# Patient Record
Sex: MALE | Race: WHITE | NOT HISPANIC OR LATINO | Employment: FULL TIME | URBAN - METROPOLITAN AREA
[De-identification: names, ages, dates, MRNs, and addresses within clinical notes are randomized per-mention and may not be internally consistent; named-entity substitution may affect disease eponyms.]

---

## 2017-01-10 ENCOUNTER — HOSPITAL ENCOUNTER (OUTPATIENT)
Dept: BONE DENSITY | Facility: HOSPITAL | Age: 52
Discharge: HOME | End: 2017-01-10
Attending: INTERNAL MEDICINE | Admitting: INTERNAL MEDICINE

## 2017-01-10 DIAGNOSIS — M84.375D STRESS FRACTURE OF LEFT FOOT WITH ROUTINE HEALING, SUBSEQUENT ENCOUNTER: ICD-10-CM

## 2017-01-10 PROCEDURE — 77080 DXA BONE DENSITY AXIAL: CPT

## 2017-01-11 RX ORDER — SITAGLIPTIN 100 MG/1
TABLET, FILM COATED ORAL
Qty: 30 TABLET | Refills: 4 | Status: SHIPPED | OUTPATIENT
Start: 2017-01-11 | End: 2017-06-12 | Stop reason: SDUPTHER

## 2017-01-26 RX ORDER — DAPAGLIFLOZIN 10 MG/1
TABLET, FILM COATED ORAL
Qty: 90 TABLET | Refills: 2 | Status: SHIPPED | OUTPATIENT
Start: 2017-01-26 | End: 2017-11-10 | Stop reason: SDUPTHER

## 2017-04-05 ENCOUNTER — TRANSCRIBE ORDERS (OUTPATIENT)
Dept: CARDIOLOGY | Facility: CLINIC | Age: 52
End: 2017-04-05

## 2017-04-05 DIAGNOSIS — Z13.9 SCREENING: Primary | ICD-10-CM

## 2017-04-12 ENCOUNTER — HOSPITAL ENCOUNTER (OUTPATIENT)
Dept: CARDIOLOGY | Facility: HOSPITAL | Age: 52
Discharge: HOME | End: 2017-04-12
Attending: INTERNAL MEDICINE

## 2017-04-12 VITALS — SYSTOLIC BLOOD PRESSURE: 125 MMHG | HEART RATE: 70 BPM | DIASTOLIC BLOOD PRESSURE: 90 MMHG

## 2017-04-12 DIAGNOSIS — Z13.9 SCREENING: ICD-10-CM

## 2017-04-12 LAB
BH CV STRESS BP STAGE 1: NORMAL
BH CV STRESS BP STAGE 2: NORMAL
BH CV STRESS BP STAGE 3: NORMAL
BH CV STRESS DURATION MIN STAGE 1: 3
BH CV STRESS DURATION MIN STAGE 2: 3
BH CV STRESS DURATION MIN STAGE 3: 3
BH CV STRESS DURATION SEC STAGE 1: 0
BH CV STRESS DURATION SEC STAGE 2: 0
BH CV STRESS DURATION SEC STAGE 3: 0
BH CV STRESS GRADE STAGE 1: 10
BH CV STRESS GRADE STAGE 2: 12
BH CV STRESS GRADE STAGE 3: 14
BH CV STRESS HR STAGE 1: 117
BH CV STRESS HR STAGE 2: 138
BH CV STRESS HR STAGE 3: 166
BH CV STRESS METS STAGE 1: 4.6
BH CV STRESS METS STAGE 2: 7.1
BH CV STRESS METS STAGE 3: 10.2
BH CV STRESS PROTOCOL 1: NORMAL
BH CV STRESS RECOVERY BP: NORMAL MMHG
BH CV STRESS RECOVERY HR: 99 BPM
BH CV STRESS SPEED STAGE 1: 1.7
BH CV STRESS SPEED STAGE 2: 2.5
BH CV STRESS SPEED STAGE 3: 3.4
BH CV STRESS STAGE 1: 1
BH CV STRESS STAGE 2: 2
BH CV STRESS STAGE 3: 3
MAXIMAL PREDICTED HEART RATE: 170 BPM
PERCENT MAX PREDICTED HR: 97.65 %
STRESS BASELINE BP: NORMAL MMHG
STRESS BASELINE HR: 88 BPM
STRESS PERCENT HR: 115 %
STRESS POST ESTIMATED WORKLOAD: 10.2 METS
STRESS POST EXERCISE DUR MIN: 9 MIN
STRESS POST EXERCISE DUR SEC: 0 SEC
STRESS POST PEAK BP: NORMAL MMHG
STRESS POST PEAK HR: 166 BPM
STRESS TARGET HR: 145 BPM

## 2017-04-12 PROCEDURE — 93018 CV STRESS TEST I&R ONLY: CPT | Performed by: INTERNAL MEDICINE

## 2017-04-12 PROCEDURE — 93017 CV STRESS TEST TRACING ONLY: CPT

## 2017-04-12 PROCEDURE — 93016 CV STRESS TEST SUPVJ ONLY: CPT | Performed by: INTERNAL MEDICINE

## 2017-04-20 ENCOUNTER — OFFICE VISIT (OUTPATIENT)
Dept: FAMILY MEDICINE CLINIC | Facility: CLINIC | Age: 52
End: 2017-04-20

## 2017-04-20 VITALS
SYSTOLIC BLOOD PRESSURE: 122 MMHG | WEIGHT: 302.2 LBS | HEART RATE: 71 BPM | OXYGEN SATURATION: 96 % | BODY MASS INDEX: 44.76 KG/M2 | HEIGHT: 69 IN | DIASTOLIC BLOOD PRESSURE: 68 MMHG | TEMPERATURE: 98.2 F

## 2017-04-20 DIAGNOSIS — E11.9 TYPE 2 DIABETES MELLITUS WITHOUT COMPLICATION, WITHOUT LONG-TERM CURRENT USE OF INSULIN (HCC): ICD-10-CM

## 2017-04-20 DIAGNOSIS — E71.318: ICD-10-CM

## 2017-04-20 DIAGNOSIS — G57.93 NEUROPATHY OF BOTH FEET: ICD-10-CM

## 2017-04-20 DIAGNOSIS — K76.0 FATTY LIVER DISEASE, NONALCOHOLIC: ICD-10-CM

## 2017-04-20 DIAGNOSIS — Z00.00 PREVENTATIVE HEALTH CARE: Primary | ICD-10-CM

## 2017-04-20 DIAGNOSIS — E88.81 INSULIN RESISTANCE: ICD-10-CM

## 2017-04-20 DIAGNOSIS — Z79.899 MEDICATION MANAGEMENT: ICD-10-CM

## 2017-04-20 DIAGNOSIS — M54.40 MIDLINE LOW BACK PAIN WITH SCIATICA, SCIATICA LATERALITY UNSPECIFIED, UNSPECIFIED CHRONICITY: ICD-10-CM

## 2017-04-20 DIAGNOSIS — Z79.899 ENCOUNTER FOR LONG-TERM (CURRENT) USE OF MEDICATIONS: ICD-10-CM

## 2017-04-20 PROCEDURE — 99214 OFFICE O/P EST MOD 30 MIN: CPT | Performed by: INTERNAL MEDICINE

## 2017-04-20 NOTE — PATIENT INSTRUCTIONS
Diagnoses and all orders for this visit:    Preventative health care  -     CBC & Differential  -     Comprehensive Metabolic Panel  -     Lipid Panel With LDL / HDL Ratio  -     Thyroid Panel With TSH  -     Hemoglobin A1c  -     Microalbumin / Creatinine Urine Ratio    Type 2 diabetes mellitus without complication, without long-term current use of insulin  Comments:  Continue on aggressive control  Monitor closely  Follow up as planned  Orders:  -     CBC & Differential  -     Comprehensive Metabolic Panel  -     Lipid Panel With LDL / HDL Ratio  -     Thyroid Panel With TSH  -     Hemoglobin A1c  -     Microalbumin / Creatinine Urine Ratio    Fatty liver disease, nonalcoholic  Comments:  Continue same meds  Follow up as planned    Disorder of fatty acid oxidation (GILBERTO)  Comments:  Stable at present time    Neuropathy of both feet  Comments:  ongoing treatment at present time    Insulin resistance  Comments:  Stable at present time    Midline low back pain with sciatica, sciatica laterality unspecified, unspecified chronicity  Comments:  Back pqain is stable at present time    Encounter for long-term (current) use of medications  -     CBC & Differential  -     Comprehensive Metabolic Panel  -     Lipid Panel With LDL / HDL Ratio  -     Thyroid Panel With TSH  -     Hemoglobin A1c  -     Microalbumin / Creatinine Urine Ratio    Medication management  -     CBC & Differential  -     Comprehensive Metabolic Panel  -     Lipid Panel With LDL / HDL Ratio  -     Thyroid Panel With TSH  -     Hemoglobin A1c  -     Microalbumin / Creatinine Urine Ratio

## 2017-04-20 NOTE — PROGRESS NOTES
Raymundo Fall is a 51 y.o. male   Chief Complaint   Patient presents with   • Diabetes     follow up    • Hypertension         Subjective   Hypertension   This is a chronic problem. The current episode started more than 1 year ago. The problem has been waxing and waning since onset. Pertinent negatives include no anxiety, blurred vision, chest pain, headaches, malaise/fatigue, neck pain, orthopnea, palpitations, peripheral edema, PND, shortness of breath or sweats. There are no associated agents to hypertension. Risk factors for coronary artery disease include diabetes mellitus, family history, male gender and obesity. Past treatments include calcium channel blockers and angiotensin blockers. The current treatment provides moderate improvement. There are no compliance problems.  There is no history of angina, kidney disease, CAD/MI, CVA, heart failure, left ventricular hypertrophy, PVD, renovascular disease, retinopathy or a thyroid problem. There is no history of chronic renal disease, coarctation of the aorta, hyperaldosteronism, hypercortisolism, hyperparathyroidism, a hypertension causing med, pheochromocytoma or sleep apnea.        Raymundo Fall is a 51 y.o.male who presents with:follow up on his diabetes.  Activity back to normal after stress fracture.  Now feels like diabetes and issues under better control and that things are really improving.  Now back to normal activity.  BP and weifght better.  Strained muscle in thigh at one point and in pain for many days.      The following portions of the patient's history were reviewed and updated as appropriate: past medical history, surgeries, family history, allergies, current medications, past social history and problem list.    A comprehensive review of 14 systems was peformed  Review of Systems   Constitutional: Negative for chills, fatigue, fever, malaise/fatigue and unexpected weight change.   HENT: Negative for ear pain, hearing loss, sinus  "pressure, sore throat and tinnitus.    Eyes: Negative for blurred vision, pain, discharge and redness.   Respiratory: Negative for cough, shortness of breath and wheezing.    Cardiovascular: Negative for chest pain, palpitations, orthopnea, leg swelling and PND.   Gastrointestinal: Negative for abdominal pain, constipation, diarrhea and nausea.   Endocrine: Negative for cold intolerance and heat intolerance.   Genitourinary: Negative for difficulty urinating, flank pain and urgency.   Musculoskeletal: Negative for back pain, joint swelling, myalgias and neck pain.   Skin: Negative for rash and wound.   Allergic/Immunologic: Negative for environmental allergies and food allergies.   Neurological: Negative for dizziness, seizures, numbness and headaches.   Hematological: Negative for adenopathy. Does not bruise/bleed easily.   Psychiatric/Behavioral: Negative for decreased concentration, dysphoric mood and sleep disturbance. The patient is not nervous/anxious.    All other systems reviewed and are negative.      I have reviewed the patient's medical history in detail and updated the computerized patient record.      Objective   Vitals:    04/20/17 1053   BP: 122/68   BP Location: Left arm   Patient Position: Sitting   Cuff Size: Large Adult   Pulse: 71   Temp: 98.2 °F (36.8 °C)   TempSrc: Oral   SpO2: 96%   Weight: (!) 302 lb 3.2 oz (137 kg)   Height: 69\" (175.3 cm)   PainSc: 0-No pain           Physical Exam   Constitutional: He is oriented to person, place, and time. He appears well-developed and well-nourished. He is active and cooperative.  Non-toxic appearance. No distress.   HENT:   Head: Normocephalic and atraumatic. Hair is normal.   Right Ear: Hearing, tympanic membrane, external ear and ear canal normal. No drainage.   Left Ear: Hearing, tympanic membrane, external ear and ear canal normal. No drainage.   Nose: Nose normal. No rhinorrhea, nasal deformity or septal deviation.   Mouth/Throat: Oropharynx is " clear and moist.   Eyes: Conjunctivae, EOM and lids are normal. Pupils are equal, round, and reactive to light. Right eye exhibits no exudate. Left eye exhibits no exudate. Right pupil is round and reactive. Left pupil is round and reactive.   Neck: Trachea normal and normal range of motion. Neck supple. Normal carotid pulses, no hepatojugular reflux and no JVD present. Carotid bruit is not present. No tracheal deviation, no edema and normal range of motion present. No thyroid mass and no thyromegaly present.   Cardiovascular: Normal rate, regular rhythm, normal heart sounds, intact distal pulses and normal pulses.   No extrasystoles are present. PMI is not displaced.    Pulmonary/Chest: Effort normal and breath sounds normal. No accessory muscle usage. No tachypnea. No respiratory distress.   Abdominal: Soft. Normal appearance, normal aorta and bowel sounds are normal. He exhibits no abdominal bruit. There is no hepatosplenomegaly. There is no tenderness. Hernia confirmed negative in the right inguinal area and confirmed negative in the left inguinal area.   Bowel sounds present and normal in all four quadrants   Musculoskeletal: Normal range of motion.   Lymphadenopathy: No inguinal adenopathy noted on the right or left side.   Neurological: He is alert and oriented to person, place, and time. He has normal strength and normal reflexes. No cranial nerve deficit or sensory deficit. He displays a negative Romberg sign.   Skin: Skin is warm, dry and intact. He is not diaphoretic. No pallor.   Psychiatric: He has a normal mood and affect. His speech is normal and behavior is normal. Judgment and thought content normal. Cognition and memory are normal.   Nursing note and vitals reviewed.      Procedures                          Assessment/Plan     Diagnoses and all orders for this visit:    Preventative health care  -     CBC & Differential  -     Comprehensive Metabolic Panel  -     Lipid Panel With LDL / HDL Ratio  -      Thyroid Panel With TSH  -     Hemoglobin A1c  -     Microalbumin / Creatinine Urine Ratio    Type 2 diabetes mellitus without complication, without long-term current use of insulin  Comments:  Continue on aggressive control  Monitor closely  Follow up as planned  Orders:  -     CBC & Differential  -     Comprehensive Metabolic Panel  -     Lipid Panel With LDL / HDL Ratio  -     Thyroid Panel With TSH  -     Hemoglobin A1c  -     Microalbumin / Creatinine Urine Ratio    Fatty liver disease, nonalcoholic  Comments:  Continue same meds  Follow up as planned    Disorder of fatty acid oxidation (GILBERTO)  Comments:  Stable at present time    Neuropathy of both feet  Comments:  ongoing treatment at present time    Insulin resistance  Comments:  Stable at present time    Midline low back pain with sciatica, sciatica laterality unspecified, unspecified chronicity  Comments:  Back pqain is stable at present time    Encounter for long-term (current) use of medications  -     CBC & Differential  -     Comprehensive Metabolic Panel  -     Lipid Panel With LDL / HDL Ratio  -     Thyroid Panel With TSH  -     Hemoglobin A1c  -     Microalbumin / Creatinine Urine Ratio    Medication management  -     CBC & Differential  -     Comprehensive Metabolic Panel  -     Lipid Panel With LDL / HDL Ratio  -     Thyroid Panel With TSH  -     Hemoglobin A1c  -     Microalbumin / Creatinine Urine Ratio           Enrique Heath MD  4/20/2017  10:55 AM

## 2017-04-21 LAB
ALBUMIN SERPL-MCNC: 4.4 G/DL (ref 3.5–5.2)
ALBUMIN/CREAT UR: <3.4 MG/G CREAT (ref 0–30)
ALBUMIN/GLOB SERPL: 1.8 G/DL
ALP SERPL-CCNC: 66 U/L (ref 39–117)
ALT SERPL-CCNC: 67 U/L (ref 1–41)
AST SERPL-CCNC: 30 U/L (ref 1–40)
BASOPHILS # BLD AUTO: 0.01 10*3/MM3 (ref 0–0.2)
BASOPHILS NFR BLD AUTO: 0.2 % (ref 0–1.5)
BILIRUB SERPL-MCNC: 0.6 MG/DL (ref 0.1–1.2)
BUN SERPL-MCNC: 13 MG/DL (ref 6–20)
BUN/CREAT SERPL: 14.1 (ref 7–25)
CALCIUM SERPL-MCNC: 9.1 MG/DL (ref 8.6–10.5)
CHLORIDE SERPL-SCNC: 101 MMOL/L (ref 98–107)
CHOLEST SERPL-MCNC: 162 MG/DL (ref 0–200)
CO2 SERPL-SCNC: 23.8 MMOL/L (ref 22–29)
CREAT SERPL-MCNC: 0.92 MG/DL (ref 0.76–1.27)
CREAT UR-MCNC: 87 MG/DL
EOSINOPHIL # BLD AUTO: 0.2 10*3/MM3 (ref 0–0.7)
EOSINOPHIL NFR BLD AUTO: 4.5 % (ref 0.3–6.2)
ERYTHROCYTE [DISTWIDTH] IN BLOOD BY AUTOMATED COUNT: 12.8 % (ref 11.5–14.5)
FT4I SERPL CALC-MCNC: 2.3 (ref 1.2–4.9)
GLOBULIN SER CALC-MCNC: 2.4 GM/DL
GLUCOSE SERPL-MCNC: 198 MG/DL (ref 65–99)
HBA1C MFR BLD: 7.6 % (ref 4.8–5.6)
HCT VFR BLD AUTO: 51.4 % (ref 40.4–52.2)
HDLC SERPL-MCNC: 40 MG/DL (ref 40–60)
HGB BLD-MCNC: 17.1 G/DL (ref 13.7–17.6)
IMM GRANULOCYTES # BLD: 0 10*3/MM3 (ref 0–0.03)
IMM GRANULOCYTES NFR BLD: 0 % (ref 0–0.5)
LDLC SERPL CALC-MCNC: 98 MG/DL (ref 0–100)
LDLC/HDLC SERPL: 2.44 {RATIO}
LYMPHOCYTES # BLD AUTO: 1.59 10*3/MM3 (ref 0.9–4.8)
LYMPHOCYTES NFR BLD AUTO: 35.4 % (ref 19.6–45.3)
MCH RBC QN AUTO: 30.1 PG (ref 27–32.7)
MCHC RBC AUTO-ENTMCNC: 33.3 G/DL (ref 32.6–36.4)
MCV RBC AUTO: 90.5 FL (ref 79.8–96.2)
MICROALBUMIN UR-MCNC: <3 UG/ML
MONOCYTES # BLD AUTO: 0.38 10*3/MM3 (ref 0.2–1.2)
MONOCYTES NFR BLD AUTO: 8.5 % (ref 5–12)
NEUTROPHILS # BLD AUTO: 2.31 10*3/MM3 (ref 1.9–8.1)
NEUTROPHILS NFR BLD AUTO: 51.4 % (ref 42.7–76)
PLATELET # BLD AUTO: 139 10*3/MM3 (ref 140–500)
POTASSIUM SERPL-SCNC: 4.5 MMOL/L (ref 3.5–5.2)
PROT SERPL-MCNC: 6.8 G/DL (ref 6–8.5)
RBC # BLD AUTO: 5.68 10*6/MM3 (ref 4.6–6)
SODIUM SERPL-SCNC: 140 MMOL/L (ref 136–145)
T3RU NFR SERPL: 25 % (ref 24–39)
T4 SERPL-MCNC: 9 UG/DL (ref 4.5–12)
TRIGL SERPL-MCNC: 122 MG/DL (ref 0–150)
TSH SERPL DL<=0.005 MIU/L-ACNC: 1.56 UIU/ML (ref 0.45–4.5)
VLDLC SERPL CALC-MCNC: 24.4 MG/DL (ref 5–40)
WBC # BLD AUTO: 4.49 10*3/MM3 (ref 4.5–10.7)

## 2017-04-22 LAB
EBV EA IGG SER-ACNC: <9 U/ML (ref 0–8.9)
EBV NA IGG SER IA-ACNC: <18 U/ML (ref 0–17.9)
EBV VCA IGG SER IA-ACNC: <18 U/ML (ref 0–17.9)
EBV VCA IGM SER IA-ACNC: <36 U/ML (ref 0–35.9)
Lab: NORMAL
SERVICE CMNT-IMP: NORMAL
WRITTEN AUTHORIZATION: NORMAL

## 2017-06-12 RX ORDER — SITAGLIPTIN 100 MG/1
TABLET, FILM COATED ORAL
Qty: 30 TABLET | Refills: 3 | Status: SHIPPED | OUTPATIENT
Start: 2017-06-12 | End: 2017-10-17 | Stop reason: SDUPTHER

## 2017-08-21 ENCOUNTER — OFFICE VISIT (OUTPATIENT)
Dept: FAMILY MEDICINE CLINIC | Facility: CLINIC | Age: 52
End: 2017-08-21

## 2017-08-21 VITALS
DIASTOLIC BLOOD PRESSURE: 86 MMHG | HEIGHT: 69 IN | HEART RATE: 76 BPM | WEIGHT: 301.6 LBS | TEMPERATURE: 98.1 F | BODY MASS INDEX: 44.67 KG/M2 | SYSTOLIC BLOOD PRESSURE: 124 MMHG | OXYGEN SATURATION: 97 %

## 2017-08-21 DIAGNOSIS — E11.9 TYPE 2 DIABETES MELLITUS WITHOUT COMPLICATION, WITHOUT LONG-TERM CURRENT USE OF INSULIN (HCC): Primary | ICD-10-CM

## 2017-08-21 DIAGNOSIS — I10 ESSENTIAL HYPERTENSION: ICD-10-CM

## 2017-08-21 PROCEDURE — 99213 OFFICE O/P EST LOW 20 MIN: CPT | Performed by: NURSE PRACTITIONER

## 2017-08-21 NOTE — PROGRESS NOTES
Subjective   Raymundo Fall is a 52 y.o. male presents for diabetes and hypertension. Doing well, no concerns at the present time. Tolerating medication. Weight is stable. States he is active.     Hypertension   This is a chronic problem. The current episode started more than 1 year ago. The problem is unchanged. The problem is controlled. Pertinent negatives include no anxiety, blurred vision, chest pain, headaches, malaise/fatigue, neck pain, orthopnea, palpitations, peripheral edema, PND, shortness of breath or sweats. There are no associated agents to hypertension. There are no known risk factors for coronary artery disease. The current treatment provides significant improvement. There are no compliance problems.         The following portions of the patient's history were reviewed and updated as appropriate: allergies, current medications, past family history, past medical history, past social history, past surgical history and problem list.    Review of Systems   Constitutional: Negative.  Negative for malaise/fatigue.   HENT: Negative.    Eyes: Negative.  Negative for blurred vision.   Respiratory: Negative.  Negative for shortness of breath.    Cardiovascular: Negative.  Negative for chest pain, palpitations, orthopnea and PND.   Gastrointestinal: Negative.    Endocrine: Negative.    Genitourinary: Negative.    Musculoskeletal: Negative.  Negative for neck pain.   Skin: Negative.    Allergic/Immunologic: Negative.    Neurological: Negative.  Negative for headaches.   Hematological: Negative.    Psychiatric/Behavioral: Negative.        Objective   Physical Exam   Constitutional: He is oriented to person, place, and time. He appears well-developed and well-nourished.   HENT:   Head: Normocephalic and atraumatic.   Eyes: Pupils are equal, round, and reactive to light.   Neck: Normal range of motion. Neck supple.   Cardiovascular: Normal rate, regular rhythm and normal heart sounds.  Exam reveals no gallop  and no friction rub.    No murmur heard.  Pulmonary/Chest: Effort normal and breath sounds normal. No respiratory distress. He has no wheezes. He has no rales.   Abdominal: Soft. Bowel sounds are normal. He exhibits no distension. There is no tenderness.   Neurological: He is alert and oriented to person, place, and time.   Skin: Skin is warm and dry.   Psychiatric: He has a normal mood and affect.   Vitals reviewed.      Assessment/Plan   Raymundo was seen today for diabetes mellitus.    Diagnoses and all orders for this visit:    Type 2 diabetes mellitus without complication, without long-term current use of insulin  -     Hemoglobin A1c  -     Comprehensive metabolic panel    Essential hypertension

## 2017-08-21 NOTE — PATIENT INSTRUCTIONS

## 2017-08-22 LAB
ALBUMIN SERPL-MCNC: 4.7 G/DL (ref 3.5–5.2)
ALBUMIN/GLOB SERPL: 1.9 G/DL
ALP SERPL-CCNC: 64 U/L (ref 39–117)
ALT SERPL-CCNC: 93 U/L (ref 1–41)
AST SERPL-CCNC: 41 U/L (ref 1–40)
BILIRUB SERPL-MCNC: 0.5 MG/DL (ref 0.1–1.2)
BUN SERPL-MCNC: 14 MG/DL (ref 6–20)
BUN/CREAT SERPL: 16.7 (ref 7–25)
CALCIUM SERPL-MCNC: 9.7 MG/DL (ref 8.6–10.5)
CHLORIDE SERPL-SCNC: 99 MMOL/L (ref 98–107)
CO2 SERPL-SCNC: 24 MMOL/L (ref 22–29)
CREAT SERPL-MCNC: 0.84 MG/DL (ref 0.76–1.27)
GLOBULIN SER CALC-MCNC: 2.5 GM/DL
GLUCOSE SERPL-MCNC: 178 MG/DL (ref 65–99)
HBA1C MFR BLD: 8.4 % (ref 4.8–5.6)
POTASSIUM SERPL-SCNC: 4.3 MMOL/L (ref 3.5–5.2)
PROT SERPL-MCNC: 7.2 G/DL (ref 6–8.5)
SODIUM SERPL-SCNC: 139 MMOL/L (ref 136–145)

## 2017-09-05 RX ORDER — AMLODIPINE AND VALSARTAN 5; 320 MG/1; MG/1
TABLET ORAL
Qty: 90 TABLET | Refills: 0 | Status: SHIPPED | OUTPATIENT
Start: 2017-09-05 | End: 2017-12-07 | Stop reason: SDUPTHER

## 2017-12-07 RX ORDER — AMLODIPINE AND VALSARTAN 5; 320 MG/1; MG/1
1 TABLET ORAL DAILY
Qty: 90 TABLET | Refills: 1 | Status: SHIPPED | OUTPATIENT
Start: 2017-12-07 | End: 2018-05-24 | Stop reason: SDUPTHER

## 2018-01-15 RX ORDER — SITAGLIPTIN 100 MG/1
TABLET, FILM COATED ORAL
Qty: 30 TABLET | Refills: 1 | Status: CANCELLED | OUTPATIENT
Start: 2018-01-15

## 2018-01-24 RX ORDER — SITAGLIPTIN 100 MG/1
TABLET, FILM COATED ORAL
Qty: 30 TABLET | Refills: 1 | Status: SHIPPED | OUTPATIENT
Start: 2018-01-24 | End: 2018-12-03 | Stop reason: SDUPTHER

## 2018-02-23 ENCOUNTER — RESULTS ENCOUNTER (OUTPATIENT)
Dept: FAMILY MEDICINE CLINIC | Facility: CLINIC | Age: 53
End: 2018-02-23

## 2018-02-23 ENCOUNTER — OFFICE VISIT (OUTPATIENT)
Dept: FAMILY MEDICINE CLINIC | Facility: CLINIC | Age: 53
End: 2018-02-23

## 2018-02-23 VITALS
BODY MASS INDEX: 43.84 KG/M2 | HEIGHT: 69 IN | HEART RATE: 76 BPM | SYSTOLIC BLOOD PRESSURE: 115 MMHG | TEMPERATURE: 97.8 F | OXYGEN SATURATION: 97 % | WEIGHT: 296 LBS | DIASTOLIC BLOOD PRESSURE: 70 MMHG

## 2018-02-23 DIAGNOSIS — Z12.11 SCREENING FOR COLON CANCER: ICD-10-CM

## 2018-02-23 DIAGNOSIS — E11.9 TYPE 2 DIABETES MELLITUS WITHOUT COMPLICATION, WITHOUT LONG-TERM CURRENT USE OF INSULIN (HCC): Primary | ICD-10-CM

## 2018-02-23 PROCEDURE — 99213 OFFICE O/P EST LOW 20 MIN: CPT | Performed by: NURSE PRACTITIONER

## 2018-02-23 NOTE — PROGRESS NOTES
Subjective   Raymundo Fall is a 52 y.o. male presents for routine follow up for hypertension and diabetes. Tolerating medication well without side effects. Does report increased urination due to farxiga.    Hypertension   This is a chronic problem. The current episode started more than 1 year ago. The problem is unchanged. The problem is controlled. Associated symptoms include headaches. Pertinent negatives include no blurred vision, chest pain or sweats. There is no history of CVA, PVD or retinopathy.   Diabetes   He has type 2 diabetes mellitus. No MedicAlert identification noted. The initial diagnosis of diabetes was made 15 years ago. Hypoglycemia symptoms include headaches. Pertinent negatives for hypoglycemia include no confusion, dizziness, hunger, mood changes, nervousness/anxiousness, pallor, seizures, sleepiness, speech difficulty, sweats or tremors. Associated symptoms include foot paresthesias and polyuria. Pertinent negatives for diabetes include no blurred vision, no chest pain, no fatigue, no foot ulcerations, no polydipsia, no polyphagia, no visual change, no weakness and no weight loss. Pertinent negatives for hypoglycemia complications include no blackouts, no hospitalization, no nocturnal hypoglycemia, no required assistance and no required glucagon injection. Symptoms are stable. Pertinent negatives for diabetic complications include no CVA, heart disease, impotence, nephropathy, peripheral neuropathy, PVD or retinopathy. Risk factors for coronary artery disease include hypertension. Current diabetic treatment includes diet and oral agent (triple therapy). He is compliant with treatment all of the time. His weight is stable. He is following a generally healthy diet. Meal planning includes avoidance of concentrated sweets. He has not had a previous visit with a dietitian. He participates in exercise every other day. He monitors blood glucose at home 1-2 x per week. Blood glucose monitoring  compliance is good. There is no change in his home blood glucose trend. He sees a podiatrist.Eye exam is current.        The following portions of the patient's history were reviewed and updated as appropriate: allergies, current medications, past family history, past medical history, past social history, past surgical history and problem list.    Review of Systems   Constitutional: Negative for fatigue and weight loss.   Eyes: Negative for blurred vision.   Cardiovascular: Negative for chest pain.   Endocrine: Positive for polyuria. Negative for polydipsia and polyphagia.   Genitourinary: Negative for impotence.   Skin: Negative for pallor.   Neurological: Positive for headaches. Negative for dizziness, tremors, seizures, speech difficulty and weakness.   Psychiatric/Behavioral: Negative for confusion. The patient is not nervous/anxious.        Objective   Physical Exam   Constitutional: He is oriented to person, place, and time. He appears well-developed and well-nourished.   HENT:   Mouth/Throat: Oropharynx is clear and moist.   Cardiovascular: Normal rate, regular rhythm and normal heart sounds.  Exam reveals no gallop and no friction rub.    No murmur heard.  Pulmonary/Chest: Effort normal and breath sounds normal. No respiratory distress. He has no wheezes. He has no rales.   Neurological: He is alert and oriented to person, place, and time.   Skin: Skin is warm and dry.   Vitals reviewed.      Assessment/Plan   Raymundo was seen today for hypertension and diabetes.    Diagnoses and all orders for this visit:    Type 2 diabetes mellitus without complication, without long-term current use of insulin  -     Comprehensive metabolic panel  -     Hemoglobin A1c    Screening for colon cancer  -     Cologuard - Stool, Per Rectum; Future

## 2018-02-24 LAB
ALBUMIN SERPL-MCNC: 4.6 G/DL (ref 3.5–5.2)
ALBUMIN/GLOB SERPL: 1.7 G/DL
ALP SERPL-CCNC: 69 U/L (ref 39–117)
ALT SERPL-CCNC: 63 U/L (ref 1–41)
AST SERPL-CCNC: 29 U/L (ref 1–40)
BILIRUB SERPL-MCNC: 0.6 MG/DL (ref 0.1–1.2)
BUN SERPL-MCNC: 18 MG/DL (ref 6–20)
BUN/CREAT SERPL: 20.5 (ref 7–25)
CALCIUM SERPL-MCNC: 9.4 MG/DL (ref 8.6–10.5)
CHLORIDE SERPL-SCNC: 98 MMOL/L (ref 98–107)
CO2 SERPL-SCNC: 27.9 MMOL/L (ref 22–29)
CREAT SERPL-MCNC: 0.88 MG/DL (ref 0.76–1.27)
GFR SERPLBLD CREATININE-BSD FMLA CKD-EPI: 111 ML/MIN/1.73
GFR SERPLBLD CREATININE-BSD FMLA CKD-EPI: 91 ML/MIN/1.73
GLOBULIN SER CALC-MCNC: 2.7 GM/DL
GLUCOSE SERPL-MCNC: 213 MG/DL (ref 65–99)
HBA1C MFR BLD: 7.9 % (ref 4.8–5.6)
POTASSIUM SERPL-SCNC: 4.5 MMOL/L (ref 3.5–5.2)
PROT SERPL-MCNC: 7.3 G/DL (ref 6–8.5)
SODIUM SERPL-SCNC: 134 MMOL/L (ref 136–145)

## 2018-05-24 RX ORDER — AMLODIPINE AND VALSARTAN 5; 320 MG/1; MG/1
TABLET ORAL
Qty: 90 TABLET | Refills: 1 | Status: SHIPPED | OUTPATIENT
Start: 2018-05-24 | End: 2018-07-24

## 2018-05-29 PROCEDURE — 71046 X-RAY EXAM CHEST 2 VIEWS: CPT | Performed by: RADIOLOGY

## 2018-07-24 ENCOUNTER — OFFICE VISIT (OUTPATIENT)
Dept: FAMILY MEDICINE CLINIC | Facility: CLINIC | Age: 53
End: 2018-07-24

## 2018-07-24 VITALS
TEMPERATURE: 97.7 F | WEIGHT: 290.5 LBS | SYSTOLIC BLOOD PRESSURE: 115 MMHG | BODY MASS INDEX: 43.03 KG/M2 | HEIGHT: 69 IN | HEART RATE: 73 BPM | DIASTOLIC BLOOD PRESSURE: 70 MMHG | OXYGEN SATURATION: 97 %

## 2018-07-24 DIAGNOSIS — E11.9 TYPE 2 DIABETES MELLITUS WITHOUT COMPLICATION, WITHOUT LONG-TERM CURRENT USE OF INSULIN (HCC): Primary | ICD-10-CM

## 2018-07-24 DIAGNOSIS — I10 ESSENTIAL HYPERTENSION: ICD-10-CM

## 2018-07-24 PROCEDURE — 99213 OFFICE O/P EST LOW 20 MIN: CPT | Performed by: NURSE PRACTITIONER

## 2018-07-24 RX ORDER — ATORVASTATIN CALCIUM 10 MG/1
10 TABLET, FILM COATED ORAL DAILY
Qty: 90 TABLET | Refills: 1 | Status: SHIPPED | OUTPATIENT
Start: 2018-07-24 | End: 2020-01-29

## 2018-07-24 RX ORDER — AMLODIPINE BESYLATE 5 MG/1
5 TABLET ORAL DAILY
Qty: 90 TABLET | Refills: 1 | Status: SHIPPED | OUTPATIENT
Start: 2018-07-24 | End: 2019-01-14 | Stop reason: SDUPTHER

## 2018-07-24 RX ORDER — LOSARTAN POTASSIUM 100 MG/1
100 TABLET ORAL DAILY
Qty: 90 TABLET | Refills: 1 | Status: SHIPPED | OUTPATIENT
Start: 2018-07-24 | End: 2019-01-14 | Stop reason: SDUPTHER

## 2018-07-24 NOTE — PROGRESS NOTES
Subjective   Raymundo Fall is a 53 y.o. male presents for routine follow up for diabetes. Doing well. Reports no significant changes in blood sugar. Tolerating medication without side effects. Also with hypertension, taking medication, blood pressure well controlled today. Agreeable for labs today. Has a podiatrist, follows up annually, states it has been about one year since he saw the physician last but will schedule an appointment as he is having foot pain.     Diabetes   He presents for his follow-up diabetic visit. He has type 2 diabetes mellitus. His disease course has been stable. There are no hypoglycemic associated symptoms. Pertinent negatives for hypoglycemia include no headaches or sweats. There are no diabetic associated symptoms. Pertinent negatives for diabetes include no blurred vision and no chest pain. There are no hypoglycemic complications. Symptoms are improving. Diabetic complications include peripheral neuropathy. Risk factors for coronary artery disease include obesity, male sex and hypertension. He is compliant with treatment most of the time.   Hypertension   This is a chronic problem. The current episode started more than 1 year ago. The problem is unchanged. The problem is controlled. Pertinent negatives include no anxiety, blurred vision, chest pain, headaches, malaise/fatigue, neck pain, orthopnea, palpitations, peripheral edema, PND, shortness of breath or sweats. There are no associated agents to hypertension. Risk factors for coronary artery disease include male gender, obesity and diabetes mellitus. Past treatments include calcium channel blockers and angiotensin blockers. Current antihypertension treatment includes calcium channel blockers and angiotensin blockers. The current treatment provides significant improvement. There are no compliance problems.         The following portions of the patient's history were reviewed and updated as appropriate: allergies, current  medications, past family history, past medical history, past social history, past surgical history and problem list.    Review of Systems   Constitutional: Negative.  Negative for malaise/fatigue.   HENT: Negative.    Eyes: Negative.  Negative for blurred vision.   Respiratory: Negative.  Negative for shortness of breath.    Cardiovascular: Negative.  Negative for chest pain, palpitations, orthopnea and PND.   Gastrointestinal: Negative.    Endocrine: Negative.    Genitourinary: Negative.    Musculoskeletal: Positive for arthralgias (left foot, f/u with podiatrist planned). Negative for neck pain.   Skin: Negative.    Allergic/Immunologic: Negative.    Neurological: Negative.  Negative for headaches.   Hematological: Negative.    Psychiatric/Behavioral: Negative.        Objective   Physical Exam   Constitutional: He is oriented to person, place, and time. He appears well-developed and well-nourished.   Cardiovascular: Normal rate, regular rhythm and normal heart sounds.  Exam reveals no gallop and no friction rub.    No murmur heard.  Pulmonary/Chest: Effort normal and breath sounds normal. No respiratory distress. He has no wheezes. He has no rales.   Neurological: He is alert and oriented to person, place, and time.   Skin: Skin is warm and dry.   Psychiatric: He has a normal mood and affect.   Vitals reviewed.      Assessment/Plan   Raymundo was seen today for diabetes and hypertension.    Diagnoses and all orders for this visit:    Type 2 diabetes mellitus without complication, without long-term current use of insulin (CMS/[facility])  -     Hemoglobin A1c  -     Comprehensive metabolic panel  -     Microalbumin / Creatinine Urine Ratio - Urine, Clean Catch    Essential hypertension  -     Comprehensive metabolic panel    Other orders  -     amLODIPine (NORVASC) 5 MG tablet; Take 1 tablet by mouth Daily.  -     losartan (COZAAR) 100 MG tablet; Take 1 tablet by mouth Daily.  -     atorvastatin (LIPITOR) 10 MG tablet;  Take 1 tablet by mouth Daily.      Recommend lipitor daily as preventative with diabetes, patient with elevated liver enzymes, fluctuates, concerned for continued increase, patient would like to wait at present time.  Discussed medication changes, patient concerned with valsartan and interested in changing medications.   Will d/c and start amlodipine and losartan.  F/u 3 months

## 2018-07-25 LAB
ALBUMIN SERPL-MCNC: 4.9 G/DL (ref 3.5–5.2)
ALBUMIN/CREAT UR: <5.1 MG/G CREAT (ref 0–30)
ALBUMIN/GLOB SERPL: 2 G/DL
ALP SERPL-CCNC: 69 U/L (ref 39–117)
ALT SERPL-CCNC: 69 U/L (ref 1–41)
AST SERPL-CCNC: 28 U/L (ref 1–40)
BILIRUB SERPL-MCNC: 0.4 MG/DL (ref 0.1–1.2)
BUN SERPL-MCNC: 16 MG/DL (ref 6–20)
BUN/CREAT SERPL: 18.2 (ref 7–25)
CALCIUM SERPL-MCNC: 9.5 MG/DL (ref 8.6–10.5)
CHLORIDE SERPL-SCNC: 100 MMOL/L (ref 98–107)
CO2 SERPL-SCNC: 23.3 MMOL/L (ref 22–29)
CREAT SERPL-MCNC: 0.88 MG/DL (ref 0.76–1.27)
CREAT UR-MCNC: 59.3 MG/DL
GLOBULIN SER CALC-MCNC: 2.5 GM/DL
GLUCOSE SERPL-MCNC: 153 MG/DL (ref 65–99)
HBA1C MFR BLD: 8.1 % (ref 4.8–5.6)
MICROALBUMIN UR-MCNC: <3 UG/ML
POTASSIUM SERPL-SCNC: 4.4 MMOL/L (ref 3.5–5.2)
PROT SERPL-MCNC: 7.4 G/DL (ref 6–8.5)
SODIUM SERPL-SCNC: 139 MMOL/L (ref 136–145)

## 2018-12-04 RX ORDER — SITAGLIPTIN 100 MG/1
TABLET, FILM COATED ORAL
Qty: 30 TABLET | Refills: 5 | Status: SHIPPED | OUTPATIENT
Start: 2018-12-04 | End: 2019-06-06 | Stop reason: SDUPTHER

## 2018-12-04 RX ORDER — DAPAGLIFLOZIN 10 MG/1
TABLET, FILM COATED ORAL
Qty: 90 TABLET | Refills: 1 | Status: SHIPPED | OUTPATIENT
Start: 2018-12-04 | End: 2019-06-06 | Stop reason: SDUPTHER

## 2019-01-15 RX ORDER — LOSARTAN POTASSIUM 100 MG/1
TABLET ORAL
Qty: 90 TABLET | Refills: 0 | Status: SHIPPED | OUTPATIENT
Start: 2019-01-15 | End: 2019-04-16 | Stop reason: SDUPTHER

## 2019-01-15 RX ORDER — AMLODIPINE BESYLATE 5 MG/1
TABLET ORAL
Qty: 90 TABLET | Refills: 0 | Status: SHIPPED | OUTPATIENT
Start: 2019-01-15 | End: 2019-04-16 | Stop reason: SDUPTHER

## 2019-01-25 ENCOUNTER — OFFICE VISIT (OUTPATIENT)
Dept: FAMILY MEDICINE CLINIC | Facility: CLINIC | Age: 54
End: 2019-01-25

## 2019-01-25 VITALS
TEMPERATURE: 98.1 F | HEART RATE: 62 BPM | WEIGHT: 293.2 LBS | BODY MASS INDEX: 43.43 KG/M2 | HEIGHT: 69 IN | RESPIRATION RATE: 13 BRPM | SYSTOLIC BLOOD PRESSURE: 122 MMHG | DIASTOLIC BLOOD PRESSURE: 78 MMHG

## 2019-01-25 DIAGNOSIS — E11.65 TYPE 2 DIABETES MELLITUS WITH HYPERGLYCEMIA, WITHOUT LONG-TERM CURRENT USE OF INSULIN (HCC): Primary | ICD-10-CM

## 2019-01-25 DIAGNOSIS — Z79.899 MEDICATION MANAGEMENT: ICD-10-CM

## 2019-01-25 DIAGNOSIS — Z12.5 SCREENING FOR PROSTATE CANCER: ICD-10-CM

## 2019-01-25 DIAGNOSIS — E78.5 HYPERLIPIDEMIA, UNSPECIFIED HYPERLIPIDEMIA TYPE: ICD-10-CM

## 2019-01-25 DIAGNOSIS — Z13.29 SCREENING FOR THYROID DISORDER: ICD-10-CM

## 2019-01-25 PROCEDURE — 90471 IMMUNIZATION ADMIN: CPT | Performed by: NURSE PRACTITIONER

## 2019-01-25 PROCEDURE — 99214 OFFICE O/P EST MOD 30 MIN: CPT | Performed by: NURSE PRACTITIONER

## 2019-01-25 PROCEDURE — 90674 CCIIV4 VAC NO PRSV 0.5 ML IM: CPT | Performed by: NURSE PRACTITIONER

## 2019-01-25 RX ORDER — LANCETS 33 GAUGE
EACH MISCELLANEOUS
Qty: 100 EACH | Refills: 1 | Status: SHIPPED | OUTPATIENT
Start: 2019-01-25 | End: 2019-01-30

## 2019-01-26 LAB
ALBUMIN SERPL-MCNC: 4.4 G/DL (ref 3.5–5.2)
ALBUMIN/GLOB SERPL: 1.7 G/DL
ALP SERPL-CCNC: 64 U/L (ref 39–117)
ALT SERPL-CCNC: 62 U/L (ref 1–41)
AST SERPL-CCNC: 32 U/L (ref 1–40)
BASOPHILS # BLD AUTO: 0.03 10*3/MM3 (ref 0–0.2)
BASOPHILS NFR BLD AUTO: 0.5 % (ref 0–1.5)
BILIRUB SERPL-MCNC: 0.5 MG/DL (ref 0.1–1.2)
BUN SERPL-MCNC: 16 MG/DL (ref 6–20)
BUN/CREAT SERPL: 19 (ref 7–25)
CALCIUM SERPL-MCNC: 9.6 MG/DL (ref 8.6–10.5)
CHLORIDE SERPL-SCNC: 102 MMOL/L (ref 98–107)
CHOLEST SERPL-MCNC: 154 MG/DL (ref 0–200)
CO2 SERPL-SCNC: 23.4 MMOL/L (ref 22–29)
CREAT SERPL-MCNC: 0.84 MG/DL (ref 0.76–1.27)
EOSINOPHIL # BLD AUTO: 0.25 10*3/MM3 (ref 0–0.7)
EOSINOPHIL NFR BLD AUTO: 4.1 % (ref 0.3–6.2)
ERYTHROCYTE [DISTWIDTH] IN BLOOD BY AUTOMATED COUNT: 12.5 % (ref 11.5–14.5)
GLOBULIN SER CALC-MCNC: 2.6 GM/DL
GLUCOSE SERPL-MCNC: 171 MG/DL (ref 65–99)
HBA1C MFR BLD: 8.2 % (ref 4.8–5.6)
HCT VFR BLD AUTO: 50 % (ref 40.4–52.2)
HDLC SERPL-MCNC: 38 MG/DL (ref 40–60)
HGB BLD-MCNC: 17 G/DL (ref 13.7–17.6)
IMM GRANULOCYTES # BLD AUTO: 0 10*3/MM3 (ref 0–0.03)
IMM GRANULOCYTES NFR BLD AUTO: 0 % (ref 0–0.5)
LDLC SERPL CALC-MCNC: 79 MG/DL (ref 0–100)
LDLC/HDLC SERPL: 2.09 {RATIO}
LYMPHOCYTES # BLD AUTO: 2.1 10*3/MM3 (ref 0.9–4.8)
LYMPHOCYTES NFR BLD AUTO: 34.3 % (ref 19.6–45.3)
MCH RBC QN AUTO: 30.6 PG (ref 27–32.7)
MCHC RBC AUTO-ENTMCNC: 34 G/DL (ref 32.6–36.4)
MCV RBC AUTO: 90.1 FL (ref 79.8–96.2)
MONOCYTES # BLD AUTO: 0.42 10*3/MM3 (ref 0.2–1.2)
MONOCYTES NFR BLD AUTO: 6.9 % (ref 5–12)
NEUTROPHILS # BLD AUTO: 3.33 10*3/MM3 (ref 1.9–8.1)
NEUTROPHILS NFR BLD AUTO: 54.2 % (ref 42.7–76)
PLATELET # BLD AUTO: 161 10*3/MM3 (ref 140–500)
POTASSIUM SERPL-SCNC: 4.2 MMOL/L (ref 3.5–5.2)
PROT SERPL-MCNC: 7 G/DL (ref 6–8.5)
PSA SERPL-MCNC: 0.32 NG/ML (ref 0–4)
RBC # BLD AUTO: 5.55 10*6/MM3 (ref 4.6–6)
SODIUM SERPL-SCNC: 141 MMOL/L (ref 136–145)
TRIGL SERPL-MCNC: 183 MG/DL (ref 0–150)
TSH SERPL DL<=0.005 MIU/L-ACNC: 1.8 MIU/ML (ref 0.27–4.2)
VLDLC SERPL CALC-MCNC: 36.6 MG/DL (ref 5–40)
WBC # BLD AUTO: 6.13 10*3/MM3 (ref 4.5–10.7)

## 2019-01-30 ENCOUNTER — TELEPHONE (OUTPATIENT)
Dept: FAMILY MEDICINE CLINIC | Facility: CLINIC | Age: 54
End: 2019-01-30

## 2019-01-30 DIAGNOSIS — E11.65 TYPE 2 DIABETES MELLITUS WITH HYPERGLYCEMIA, WITHOUT LONG-TERM CURRENT USE OF INSULIN (HCC): ICD-10-CM

## 2019-01-30 DIAGNOSIS — E11.9 TYPE 2 DIABETES MELLITUS WITHOUT COMPLICATION, WITHOUT LONG-TERM CURRENT USE OF INSULIN (HCC): ICD-10-CM

## 2019-01-30 DIAGNOSIS — E11.9 TYPE 2 DIABETES MELLITUS WITHOUT COMPLICATION, WITHOUT LONG-TERM CURRENT USE OF INSULIN (HCC): Primary | ICD-10-CM

## 2019-01-30 RX ORDER — BLOOD-GLUCOSE METER
1 KIT MISCELLANEOUS AS NEEDED
Qty: 1 EACH | Refills: 0 | Status: SHIPPED | OUTPATIENT
Start: 2019-01-30 | End: 2020-01-30

## 2019-01-30 RX ORDER — LANCETS 28 GAUGE
EACH MISCELLANEOUS
Qty: 100 EACH | Refills: 12 | Status: SHIPPED | OUTPATIENT
Start: 2019-01-30

## 2019-02-01 ENCOUNTER — TRANSCRIBE ORDERS (OUTPATIENT)
Dept: ADMINISTRATIVE | Facility: HOSPITAL | Age: 54
End: 2019-02-01

## 2019-02-01 DIAGNOSIS — Z13.9 VISIT FOR SCREENING: Primary | ICD-10-CM

## 2019-02-08 ENCOUNTER — HOSPITAL ENCOUNTER (OUTPATIENT)
Dept: CARDIOLOGY | Facility: HOSPITAL | Age: 54
Discharge: HOME | End: 2019-02-08

## 2019-02-08 ENCOUNTER — APPOINTMENT (OUTPATIENT)
Dept: WOMENS IMAGING | Facility: HOSPITAL | Age: 54
End: 2019-02-08

## 2019-02-08 VITALS
HEART RATE: 78 BPM | HEIGHT: 69 IN | DIASTOLIC BLOOD PRESSURE: 70 MMHG | SYSTOLIC BLOOD PRESSURE: 125 MMHG | WEIGHT: 288 LBS | BODY MASS INDEX: 42.65 KG/M2

## 2019-02-08 DIAGNOSIS — Z13.9 VISIT FOR SCREENING: ICD-10-CM

## 2019-02-08 LAB
BH CV ECHO MEAS - DIST AO DIAM: NORMAL CM
BH CV VAS BP LEFT ARM: NORMAL MMHG
BH CV VAS BP RIGHT ARM: NORMAL MMHG
BH CV XLRA MEAS - MID AO DIAM: NORMAL CM
BH CV XLRA MEAS - PAD LEFT ABI DP: 1.28
BH CV XLRA MEAS - PAD LEFT ABI PT: 1.28
BH CV XLRA MEAS - PAD LEFT ARM: 122 MMHG
BH CV XLRA MEAS - PAD LEFT LEG DP: 160 MMHG
BH CV XLRA MEAS - PAD LEFT LEG PT: 160 MMHG
BH CV XLRA MEAS - PAD RIGHT ABI DP: 1.28
BH CV XLRA MEAS - PAD RIGHT ABI PT: 1.28
BH CV XLRA MEAS - PAD RIGHT ARM: 125 MMHG
BH CV XLRA MEAS - PAD RIGHT LEG DP: 160 MMHG
BH CV XLRA MEAS - PAD RIGHT LEG PT: 160 MMHG
BH CV XLRA MEAS - PROX AO DIAM: 2.11 CM
BH CV XLRA MEAS LEFT ICA/CCA RATIO: 0.73
BH CV XLRA MEAS LEFT MID CCA PSV: NORMAL CM/SEC
BH CV XLRA MEAS LEFT MID ICA PSV: NORMAL CM/SEC
BH CV XLRA MEAS LEFT PROX ECA PSV: NORMAL CM/SEC
BH CV XLRA MEAS RIGHT ICA/CCA RATIO: 1.05
BH CV XLRA MEAS RIGHT MID CCA PSV: NORMAL CM/SEC
BH CV XLRA MEAS RIGHT MID ICA PSV: NORMAL CM/SEC
BH CV XLRA MEAS RIGHT PROX ECA PSV: NORMAL CM/SEC

## 2019-02-08 PROCEDURE — 93799 UNLISTED CV SVC/PROCEDURE: CPT

## 2019-02-08 PROCEDURE — 71046 X-RAY EXAM CHEST 2 VIEWS: CPT | Performed by: RADIOLOGY

## 2019-04-16 RX ORDER — LOSARTAN POTASSIUM 100 MG/1
TABLET ORAL
Qty: 90 TABLET | Refills: 1 | Status: SHIPPED | OUTPATIENT
Start: 2019-04-16 | End: 2019-10-07 | Stop reason: SDUPTHER

## 2019-04-16 RX ORDER — AMLODIPINE BESYLATE 5 MG/1
TABLET ORAL
Qty: 90 TABLET | Refills: 1 | Status: SHIPPED | OUTPATIENT
Start: 2019-04-16 | End: 2019-10-07 | Stop reason: SDUPTHER

## 2019-05-28 ENCOUNTER — OFFICE VISIT (OUTPATIENT)
Dept: FAMILY MEDICINE CLINIC | Facility: CLINIC | Age: 54
End: 2019-05-28

## 2019-05-28 VITALS
BODY MASS INDEX: 43.6 KG/M2 | DIASTOLIC BLOOD PRESSURE: 84 MMHG | TEMPERATURE: 97.8 F | SYSTOLIC BLOOD PRESSURE: 130 MMHG | HEART RATE: 76 BPM | WEIGHT: 291 LBS | OXYGEN SATURATION: 96 %

## 2019-05-28 DIAGNOSIS — I10 ESSENTIAL HYPERTENSION: ICD-10-CM

## 2019-05-28 DIAGNOSIS — E11.65 TYPE 2 DIABETES MELLITUS WITH HYPERGLYCEMIA, WITHOUT LONG-TERM CURRENT USE OF INSULIN (HCC): Primary | ICD-10-CM

## 2019-05-28 PROCEDURE — 90471 IMMUNIZATION ADMIN: CPT | Performed by: NURSE PRACTITIONER

## 2019-05-28 PROCEDURE — 99213 OFFICE O/P EST LOW 20 MIN: CPT | Performed by: NURSE PRACTITIONER

## 2019-05-28 PROCEDURE — 90715 TDAP VACCINE 7 YRS/> IM: CPT | Performed by: NURSE PRACTITIONER

## 2019-05-28 PROCEDURE — 90732 PPSV23 VACC 2 YRS+ SUBQ/IM: CPT | Performed by: NURSE PRACTITIONER

## 2019-05-28 PROCEDURE — 90472 IMMUNIZATION ADMIN EACH ADD: CPT | Performed by: NURSE PRACTITIONER

## 2019-05-28 NOTE — PROGRESS NOTES
Subjective   Raymundo Fall is a 54 y.o. male presents for diabetes and hypertension follow up. Did report increased left foot pain. Started on Aleve every 12 hours daily. Followed up with Dr. Torres.   Occasionally checks glucose at home, 130. Stable.     Hypertension   This is a chronic problem. The current episode started more than 1 year ago. The problem is unchanged. The problem is controlled. Pertinent negatives include no anxiety, blurred vision, chest pain, headaches, malaise/fatigue, neck pain, orthopnea, palpitations, peripheral edema, PND, shortness of breath or sweats. There are no associated agents to hypertension. Past treatments include angiotensin blockers and calcium channel blockers. Current antihypertension treatment includes angiotensin blockers and calcium channel blockers. The current treatment provides moderate improvement. Compliance problems include exercise.    Diabetes   He presents for his follow-up diabetic visit. He has type 2 diabetes mellitus. Pertinent negatives for hypoglycemia include no headaches or sweats. There are no diabetic associated symptoms. Pertinent negatives for diabetes include no blurred vision and no chest pain. There are no hypoglycemic complications. Symptoms are stable. There are no diabetic complications. Risk factors for coronary artery disease include diabetes mellitus, dyslipidemia, hypertension, male sex and obesity. Current diabetic treatment includes oral agent (dual therapy). He is compliant with treatment most of the time.        The following portions of the patient's history were reviewed and updated as appropriate: allergies, current medications, past family history, past medical history, past social history, past surgical history and problem list.    Review of Systems   Constitutional: Negative.  Negative for malaise/fatigue.   HENT: Negative.    Eyes: Negative.  Negative for blurred vision.   Respiratory: Negative.  Negative for shortness  of breath.    Cardiovascular: Negative.  Negative for chest pain, palpitations, orthopnea and PND.   Gastrointestinal: Negative.    Endocrine: Negative.    Genitourinary: Negative.    Musculoskeletal: Negative.  Negative for neck pain.   Skin: Negative.    Allergic/Immunologic: Negative.    Neurological: Negative.  Negative for headaches.   Hematological: Negative.    Psychiatric/Behavioral: Negative.        Objective   Physical Exam   Constitutional: He is oriented to person, place, and time. He appears well-developed and well-nourished. No distress.   Neck: Neck supple.   Cardiovascular: Normal rate, regular rhythm and normal heart sounds. Exam reveals no gallop and no friction rub.   No murmur heard.  Pulmonary/Chest: Effort normal and breath sounds normal. No respiratory distress. He has no wheezes. He has no rales.   Abdominal: Soft. Bowel sounds are normal. He exhibits no distension. There is no tenderness.   Neurological: He is alert and oriented to person, place, and time.   Skin: Skin is warm and dry. He is not diaphoretic.   Psychiatric: He has a normal mood and affect.       Assessment/Plan   Raymundo was seen today for blood pressure check and diabetes.    Diagnoses and all orders for this visit:    Type 2 diabetes mellitus with hyperglycemia, without long-term current use of insulin (CMS/[facility])  -     Comprehensive metabolic panel  -     Hemoglobin A1c    Essential hypertension  -     Comprehensive metabolic panel    Other orders  -     Pneumococcal Polysaccharide Vaccine 23-Valent (PPSV23) Greater Than or Equal To 1yo Subcutaneous / IM  -     Tdap Vaccine Greater Than or Equal To 6yo IM      Ok for tdap and pneumonia vaccine  Up to date on eye exam and podiatry exam  Fasting labs today  Follow up in 4 months

## 2019-05-29 LAB
ALBUMIN SERPL-MCNC: 4.8 G/DL (ref 3.5–5.2)
ALBUMIN/GLOB SERPL: 2.3 G/DL
ALP SERPL-CCNC: 75 U/L (ref 39–117)
ALT SERPL-CCNC: 49 U/L (ref 1–41)
AST SERPL-CCNC: 20 U/L (ref 1–40)
BILIRUB SERPL-MCNC: 0.6 MG/DL (ref 0.2–1.2)
BUN SERPL-MCNC: 18 MG/DL (ref 6–20)
BUN/CREAT SERPL: 20.9 (ref 7–25)
CALCIUM SERPL-MCNC: 9.9 MG/DL (ref 8.6–10.5)
CHLORIDE SERPL-SCNC: 103 MMOL/L (ref 98–107)
CO2 SERPL-SCNC: 21.8 MMOL/L (ref 22–29)
CREAT SERPL-MCNC: 0.86 MG/DL (ref 0.76–1.27)
GLOBULIN SER CALC-MCNC: 2.1 GM/DL
GLUCOSE SERPL-MCNC: 275 MG/DL (ref 65–99)
HBA1C MFR BLD: 7.8 % (ref 4.8–5.6)
POTASSIUM SERPL-SCNC: 4.1 MMOL/L (ref 3.5–5.2)
PROT SERPL-MCNC: 6.9 G/DL (ref 6–8.5)
SODIUM SERPL-SCNC: 139 MMOL/L (ref 136–145)

## 2019-06-06 RX ORDER — DAPAGLIFLOZIN 10 MG/1
TABLET, FILM COATED ORAL
Qty: 90 TABLET | Refills: 0 | Status: SHIPPED | OUTPATIENT
Start: 2019-06-06 | End: 2019-09-05 | Stop reason: SDUPTHER

## 2019-06-06 RX ORDER — SITAGLIPTIN 100 MG/1
TABLET, FILM COATED ORAL
Qty: 30 TABLET | Refills: 4 | Status: SHIPPED | OUTPATIENT
Start: 2019-06-06 | End: 2019-11-07 | Stop reason: SDUPTHER

## 2019-08-19 ENCOUNTER — OFFICE VISIT (OUTPATIENT)
Dept: FAMILY MEDICINE CLINIC | Facility: CLINIC | Age: 54
End: 2019-08-19

## 2019-08-19 VITALS
DIASTOLIC BLOOD PRESSURE: 80 MMHG | SYSTOLIC BLOOD PRESSURE: 138 MMHG | TEMPERATURE: 98.6 F | HEART RATE: 78 BPM | HEIGHT: 69 IN | OXYGEN SATURATION: 98 % | WEIGHT: 292 LBS | BODY MASS INDEX: 43.25 KG/M2 | RESPIRATION RATE: 20 BRPM

## 2019-08-19 DIAGNOSIS — J98.01 COUGH DUE TO BRONCHOSPASM: Primary | ICD-10-CM

## 2019-08-19 PROCEDURE — 96372 THER/PROPH/DIAG INJ SC/IM: CPT | Performed by: NURSE PRACTITIONER

## 2019-08-19 PROCEDURE — 99213 OFFICE O/P EST LOW 20 MIN: CPT | Performed by: NURSE PRACTITIONER

## 2019-08-19 PROCEDURE — 94640 AIRWAY INHALATION TREATMENT: CPT | Performed by: NURSE PRACTITIONER

## 2019-08-19 RX ORDER — ALBUTEROL SULFATE 90 UG/1
2 AEROSOL, METERED RESPIRATORY (INHALATION) EVERY 4 HOURS PRN
Qty: 1 INHALER | Refills: 2 | Status: ON HOLD | OUTPATIENT
Start: 2019-08-19 | End: 2020-02-04

## 2019-08-19 RX ORDER — PREDNISONE 20 MG/1
40 TABLET ORAL DAILY
Qty: 10 TABLET | Refills: 0 | Status: SHIPPED | OUTPATIENT
Start: 2019-08-19 | End: 2019-08-24

## 2019-08-19 RX ORDER — IPRATROPIUM BROMIDE AND ALBUTEROL SULFATE 2.5; .5 MG/3ML; MG/3ML
3 SOLUTION RESPIRATORY (INHALATION) ONCE
Status: COMPLETED | OUTPATIENT
Start: 2019-08-19 | End: 2019-08-19

## 2019-08-19 RX ORDER — TRIAMCINOLONE ACETONIDE 40 MG/ML
40 INJECTION, SUSPENSION INTRA-ARTICULAR; INTRAMUSCULAR ONCE
Status: COMPLETED | OUTPATIENT
Start: 2019-08-19 | End: 2019-08-19

## 2019-08-19 RX ORDER — AZITHROMYCIN 250 MG/1
TABLET, FILM COATED ORAL
Qty: 6 TABLET | Refills: 0 | Status: CANCELLED | OUTPATIENT
Start: 2019-08-19

## 2019-08-19 RX ADMIN — TRIAMCINOLONE ACETONIDE 40 MG: 40 INJECTION, SUSPENSION INTRA-ARTICULAR; INTRAMUSCULAR at 15:57

## 2019-08-19 RX ADMIN — IPRATROPIUM BROMIDE AND ALBUTEROL SULFATE 3 ML: 2.5; .5 SOLUTION RESPIRATORY (INHALATION) at 15:39

## 2019-08-19 NOTE — PROGRESS NOTES
"Subjective   Raymundo Fall is a 54 y.o. male.     Chief Complaint   Patient presents with   • Cough     2 weeks or better   • URI      HPI  Patient is new to me.  He is here for Cough with dyspnea x 2 weeks and is worsening.  Cough is all the time, may be a little worse during the day.  Not taking anything for it except mucinex which is not helping.  Nothing seems to trigger cough but he has paroxysms that sometimes make him feel dyspneic. Cough is dry and his chest feels tight and like he is wheezy.  Denies PMH asthma or COPD.   Does not do well with cough meds-make him woozy.  Never gets sick and does not have allergies. No sick contacts.    Social History     Tobacco Use   • Smoking status: Never Smoker   • Smokeless tobacco: Never Used   Substance Use Topics   • Alcohol use: No   • Drug use: No       The following portions of the patient's history were reviewed and updated as appropriate: allergies, current medications, past family history, past medical history, past social history, past surgical history and problem list.    Review of Systems   Constitutional: Negative for chills, fatigue and fever.   HENT: Negative for congestion, ear discharge, ear pain, postnasal drip, rhinorrhea (minimal) and sore throat.    Eyes: Negative for discharge and itching.   Respiratory: Positive for cough, chest tightness, shortness of breath and wheezing.    Cardiovascular: Negative for chest pain and palpitations.   Gastrointestinal: Negative for abdominal pain, diarrhea, nausea and vomiting.   Musculoskeletal: Negative for arthralgias and myalgias.   Allergic/Immunologic: Negative for environmental allergies.   Neurological: Negative for weakness and headaches.       Objective   Blood pressure 138/80, pulse 78, temperature 98.6 °F (37 °C), resp. rate 20, height 174 cm (68.5\"), weight 132 kg (292 lb), SpO2 98 %.    Physical Exam   Constitutional: He is oriented to person, place, and time. He appears well-developed and " well-nourished. No distress.   HENT:   Head: Normocephalic and atraumatic.   Right Ear: Tympanic membrane, external ear and ear canal normal.   Left Ear: Tympanic membrane, external ear and ear canal normal.   Mouth/Throat: Oropharynx is clear and moist.   Eyes: Conjunctivae are normal. Right eye exhibits no discharge. Left eye exhibits no discharge.   Neck: Neck supple.   Cardiovascular: Normal rate, regular rhythm and normal heart sounds.   Pulmonary/Chest: Effort normal. He has decreased breath sounds.   Initially breath sounds tight but without wheezing or other adventitious sounds  Post MN breath sounds greatly improved, patient verbalizes less cough and no chest tightness post MN   Abdominal: Soft. Bowel sounds are normal. There is no tenderness.   Musculoskeletal: He exhibits no deformity.   Gait smooth and steady   Lymphadenopathy:     He has no cervical adenopathy.   Neurological: He is alert and oriented to person, place, and time.   Skin: Skin is warm and dry. He is not diaphoretic.   Psychiatric: He has a normal mood and affect.   Nursing note and vitals reviewed.      Assessment   Problem List Items Addressed This Visit     None      Visit Diagnoses     Cough due to bronchospasm    -  Primary    Relevant Medications    ipratropium-albuterol (DUO-NEB) nebulizer solution 3 mL (Completed)    albuterol sulfate HFA (PROAIR HFA) 108 (90 Base) MCG/ACT inhaler    predniSONE (DELTASONE) 20 MG tablet    triamcinolone acetonide (KENALOG-40) injection 40 mg (Completed)           Procedures           Impression and Plan:  Albuterol inhaler prn for cough since MN was so helpful.   Kenalog injection today and will start prednisone tomorrow am x5 days.  If he has noticeable improvement in sx he can stop steroids after 3 days.  If he is worsening or not improving in the next few days he will let me know-may need an abx, but I dont think this will be necessary.  Would like him to start OTC zyrtec as ragweed is high right  now and this may have allergic component even though he denies allergies.   For cough-cough drops and/or honey since he cannot tolerate cough meds.    Reviewed side effect of prednisone causing temporary increase in blood sugars.  His T2DM appears to be fairly well controlled with A1C of 7.8.    Health Maintenance Due   Topic Date Due   • ANNUAL PHYSICAL  05/20/1968   • ZOSTER VACCINE (1 of 2) 05/20/2016   • URINE MICROALBUMIN  07/24/2019   • INFLUENZA VACCINE  08/01/2019              EMR Dragon/Transcription disclaimer:   Much of this encounter note is an electronic transcription/translation of spoken language to printed text. The electronic translation of spoken language may permit erroneous, or at times, nonsensical words or phrases to be inadvertently transcribed; Although I have reviewed the note for such errors, some may still exist.

## 2019-09-06 RX ORDER — DAPAGLIFLOZIN 10 MG/1
TABLET, FILM COATED ORAL
Qty: 90 TABLET | Refills: 0 | Status: SHIPPED | OUTPATIENT
Start: 2019-09-06 | End: 2019-12-05 | Stop reason: SDUPTHER

## 2019-09-25 ENCOUNTER — OFFICE VISIT (OUTPATIENT)
Dept: FAMILY MEDICINE CLINIC | Facility: CLINIC | Age: 54
End: 2019-09-25

## 2019-09-25 VITALS
TEMPERATURE: 98.2 F | SYSTOLIC BLOOD PRESSURE: 128 MMHG | WEIGHT: 290.3 LBS | DIASTOLIC BLOOD PRESSURE: 82 MMHG | HEART RATE: 79 BPM | BODY MASS INDEX: 43 KG/M2 | OXYGEN SATURATION: 96 % | HEIGHT: 69 IN

## 2019-09-25 DIAGNOSIS — E11.9 TYPE 2 DIABETES MELLITUS WITHOUT COMPLICATION, WITHOUT LONG-TERM CURRENT USE OF INSULIN (HCC): Primary | ICD-10-CM

## 2019-09-25 PROCEDURE — 90674 CCIIV4 VAC NO PRSV 0.5 ML IM: CPT | Performed by: NURSE PRACTITIONER

## 2019-09-25 PROCEDURE — 90471 IMMUNIZATION ADMIN: CPT | Performed by: NURSE PRACTITIONER

## 2019-09-25 PROCEDURE — 99213 OFFICE O/P EST LOW 20 MIN: CPT | Performed by: NURSE PRACTITIONER

## 2019-09-25 RX ORDER — MELOXICAM 7.5 MG/1
7.5 TABLET ORAL DAILY
Qty: 30 TABLET | Refills: 1 | Status: SHIPPED | OUTPATIENT
Start: 2019-09-25 | End: 2019-12-23

## 2019-09-25 NOTE — PROGRESS NOTES
Subjective   Raymundo Fall is a 54 y.o. male. Taking aleve for arthritis, feels increased pain in left foot, right knee, all over body pain. Difficult to get up off the floor. He has tried aleve, ibuprofen, tylenol, topical treatments with little relief.     Also with recent breathing concerns. Was treated with steroids and an inhaler and doing much better. Will get occasional wheezing when mowing the yard, has started to wear something to cover his mouth to prevent allergies.     Hypertension   This is a chronic problem. The current episode started more than 1 year ago. The problem is unchanged. The problem is controlled. Pertinent negatives include no anxiety, blurred vision, chest pain, headaches, malaise/fatigue, neck pain, orthopnea, palpitations, peripheral edema, PND, shortness of breath (improved significantly with treatment of nebulizer) or sweats. There are no associated agents to hypertension. Risk factors for coronary artery disease include diabetes mellitus, dyslipidemia, obesity and male gender. Past treatments include calcium channel blockers and angiotensin blockers. Current antihypertension treatment includes angiotensin blockers and calcium channel blockers. The current treatment provides significant improvement.   Diabetes   He presents for his follow-up diabetic visit. He has type 2 diabetes mellitus. His disease course has been stable. Pertinent negatives for hypoglycemia include no headaches or sweats. Pertinent negatives for diabetes include no blurred vision and no chest pain. There are no hypoglycemic complications. There are no diabetic complications. Risk factors for coronary artery disease include diabetes mellitus, dyslipidemia, male sex and hypertension. He is compliant with treatment most of the time.        The following portions of the patient's history were reviewed and updated as appropriate: allergies, current medications, past family history, past medical history, past  social history, past surgical history and problem list.    Review of Systems   Constitutional: Negative.  Negative for malaise/fatigue.   HENT: Negative.    Eyes: Negative.  Negative for blurred vision.   Respiratory: Positive for cough (lingering, mild, ) and wheezing (intermittent, improved with inhaler). Negative for shortness of breath (improved significantly with treatment of nebulizer).    Cardiovascular: Negative.  Negative for chest pain, palpitations, orthopnea and PND.   Gastrointestinal: Negative.    Endocrine: Negative.    Genitourinary: Negative.    Musculoskeletal: Positive for arthralgias (shoulder, knees, feet, etc) and back pain. Negative for neck pain.   Skin: Negative.    Allergic/Immunologic: Negative.    Neurological: Negative.  Negative for headaches.   Hematological: Negative.    Psychiatric/Behavioral: Negative.        Objective   Physical Exam   Constitutional: He is oriented to person, place, and time. He appears well-developed and well-nourished. No distress.   Neck: Neck supple.   Cardiovascular: Normal rate, regular rhythm and normal heart sounds. Exam reveals no gallop and no friction rub.   No murmur heard.  Pulmonary/Chest: Effort normal and breath sounds normal. No respiratory distress. He has no wheezes. He has no rales.   Abdominal: Soft. Bowel sounds are normal. He exhibits no distension. There is no tenderness.   Neurological: He is alert and oriented to person, place, and time.   Skin: Skin is warm and dry. He is not diaphoretic.   Psychiatric: He has a normal mood and affect.       Assessment/Plan   Raymundo was seen today for follow-up.    Diagnoses and all orders for this visit:    Type 2 diabetes mellitus without complication, without long-term current use of insulin (CMS/[facility])  -     Comprehensive metabolic panel  -     Hemoglobin A1c    Other orders  -     meloxicam (MOBIC) 7.5 MG tablet; Take 1 tablet by mouth Daily.  -     Flucelvax Quad=>4Years ([id number])      Flu shot  today  Start mobic for arthralgias, instructed to d/c aleve/ibuprofen products, ok to take tylenol prn  Labs today to monitor diabetes  Recent steroids may have increased A1C, will monitor

## 2019-09-26 LAB
ALBUMIN SERPL-MCNC: 4.6 G/DL (ref 3.5–5.2)
ALBUMIN/GLOB SERPL: 1.8 G/DL
ALP SERPL-CCNC: 64 U/L (ref 39–117)
ALT SERPL-CCNC: 55 U/L (ref 1–41)
AST SERPL-CCNC: 32 U/L (ref 1–40)
BILIRUB SERPL-MCNC: 0.6 MG/DL (ref 0.2–1.2)
BUN SERPL-MCNC: 19 MG/DL (ref 6–20)
BUN/CREAT SERPL: 21.6 (ref 7–25)
CALCIUM SERPL-MCNC: 9.4 MG/DL (ref 8.6–10.5)
CHLORIDE SERPL-SCNC: 103 MMOL/L (ref 98–107)
CO2 SERPL-SCNC: 24.4 MMOL/L (ref 22–29)
CREAT SERPL-MCNC: 0.88 MG/DL (ref 0.76–1.27)
GLOBULIN SER CALC-MCNC: 2.5 GM/DL
GLUCOSE SERPL-MCNC: 183 MG/DL (ref 65–99)
HBA1C MFR BLD: 8.8 % (ref 4.8–5.6)
POTASSIUM SERPL-SCNC: 4.4 MMOL/L (ref 3.5–5.2)
PROT SERPL-MCNC: 7.1 G/DL (ref 6–8.5)
SODIUM SERPL-SCNC: 142 MMOL/L (ref 136–145)

## 2019-10-07 RX ORDER — LOSARTAN POTASSIUM 100 MG/1
TABLET ORAL
Qty: 90 TABLET | Refills: 0 | Status: SHIPPED | OUTPATIENT
Start: 2019-10-07 | End: 2020-01-29

## 2019-10-07 RX ORDER — AMLODIPINE BESYLATE 5 MG/1
TABLET ORAL
Qty: 90 TABLET | Refills: 0 | Status: SHIPPED | OUTPATIENT
Start: 2019-10-07 | End: 2020-01-15

## 2019-11-07 RX ORDER — SITAGLIPTIN 100 MG/1
TABLET, FILM COATED ORAL
Qty: 30 TABLET | Refills: 3 | Status: SHIPPED | OUTPATIENT
Start: 2019-11-07 | End: 2020-03-06

## 2019-12-06 RX ORDER — DAPAGLIFLOZIN 10 MG/1
TABLET, FILM COATED ORAL
Qty: 90 TABLET | Refills: 0 | Status: SHIPPED | OUTPATIENT
Start: 2019-12-06 | End: 2020-03-06

## 2019-12-23 RX ORDER — MELOXICAM 7.5 MG/1
TABLET ORAL
Qty: 30 TABLET | Refills: 0 | Status: SHIPPED | OUTPATIENT
Start: 2019-12-23 | End: 2020-01-15

## 2020-01-15 RX ORDER — LOSARTAN POTASSIUM 50 MG/1
TABLET ORAL
Qty: 180 TABLET | Refills: 0 | Status: SHIPPED | OUTPATIENT
Start: 2020-01-15 | End: 2020-03-05 | Stop reason: ALTCHOICE

## 2020-01-15 RX ORDER — MELOXICAM 7.5 MG/1
TABLET ORAL
Qty: 30 TABLET | Refills: 0 | Status: SHIPPED | OUTPATIENT
Start: 2020-01-15 | End: 2020-03-06

## 2020-01-15 RX ORDER — AMLODIPINE BESYLATE 5 MG/1
TABLET ORAL
Qty: 90 TABLET | Refills: 0 | Status: SHIPPED | OUTPATIENT
Start: 2020-01-15 | End: 2020-03-03

## 2020-01-16 DIAGNOSIS — I10 ESSENTIAL HYPERTENSION: Primary | ICD-10-CM

## 2020-01-16 DIAGNOSIS — Z12.5 SCREENING FOR PROSTATE CANCER: ICD-10-CM

## 2020-01-16 DIAGNOSIS — Z13.29 SCREENING FOR THYROID DISORDER: ICD-10-CM

## 2020-01-16 DIAGNOSIS — K76.0 FATTY LIVER DISEASE, NONALCOHOLIC: ICD-10-CM

## 2020-01-16 DIAGNOSIS — E78.1 HYPERTRIGLYCERIDEMIA: ICD-10-CM

## 2020-01-16 DIAGNOSIS — E11.9 TYPE 2 DIABETES MELLITUS WITHOUT COMPLICATION, WITHOUT LONG-TERM CURRENT USE OF INSULIN (HCC): ICD-10-CM

## 2020-01-17 ENCOUNTER — RESULTS ENCOUNTER (OUTPATIENT)
Dept: FAMILY MEDICINE CLINIC | Facility: CLINIC | Age: 55
End: 2020-01-17

## 2020-01-17 DIAGNOSIS — E11.9 TYPE 2 DIABETES MELLITUS WITHOUT COMPLICATION, WITHOUT LONG-TERM CURRENT USE OF INSULIN (HCC): ICD-10-CM

## 2020-01-17 DIAGNOSIS — I10 ESSENTIAL HYPERTENSION: ICD-10-CM

## 2020-01-17 DIAGNOSIS — Z13.29 SCREENING FOR THYROID DISORDER: ICD-10-CM

## 2020-01-17 DIAGNOSIS — K76.0 FATTY LIVER DISEASE, NONALCOHOLIC: ICD-10-CM

## 2020-01-17 DIAGNOSIS — E78.1 HYPERTRIGLYCERIDEMIA: ICD-10-CM

## 2020-01-17 DIAGNOSIS — Z12.5 SCREENING FOR PROSTATE CANCER: ICD-10-CM

## 2020-01-20 LAB
ALBUMIN SERPL-MCNC: 4.5 G/DL (ref 3.5–5.2)
ALBUMIN/GLOB SERPL: 2.3 G/DL
ALP SERPL-CCNC: 62 U/L (ref 39–117)
ALT SERPL-CCNC: 61 U/L (ref 1–41)
AST SERPL-CCNC: 27 U/L (ref 1–40)
BASOPHILS # BLD AUTO: 0.03 10*3/MM3 (ref 0–0.2)
BASOPHILS NFR BLD AUTO: 0.6 % (ref 0–1.5)
BILIRUB SERPL-MCNC: 0.5 MG/DL (ref 0.2–1.2)
BUN SERPL-MCNC: 15 MG/DL (ref 6–20)
BUN/CREAT SERPL: 16.7 (ref 7–25)
CALCIUM SERPL-MCNC: 8.8 MG/DL (ref 8.6–10.5)
CHLORIDE SERPL-SCNC: 101 MMOL/L (ref 98–107)
CHOLEST SERPL-MCNC: 157 MG/DL (ref 0–200)
CO2 SERPL-SCNC: 23.4 MMOL/L (ref 22–29)
CREAT SERPL-MCNC: 0.9 MG/DL (ref 0.76–1.27)
EOSINOPHIL # BLD AUTO: 0.2 10*3/MM3 (ref 0–0.4)
EOSINOPHIL NFR BLD AUTO: 4.2 % (ref 0.3–6.2)
ERYTHROCYTE [DISTWIDTH] IN BLOOD BY AUTOMATED COUNT: 12.4 % (ref 12.3–15.4)
GLOBULIN SER CALC-MCNC: 2 GM/DL
GLUCOSE SERPL-MCNC: 177 MG/DL (ref 65–99)
HBA1C MFR BLD: 8.7 % (ref 4.8–5.6)
HCT VFR BLD AUTO: 49.4 % (ref 37.5–51)
HDLC SERPL-MCNC: 43 MG/DL (ref 40–60)
HGB BLD-MCNC: 17 G/DL (ref 13–17.7)
IMM GRANULOCYTES # BLD AUTO: 0.01 10*3/MM3 (ref 0–0.05)
IMM GRANULOCYTES NFR BLD AUTO: 0.2 % (ref 0–0.5)
LDLC SERPL CALC-MCNC: 93 MG/DL (ref 0–100)
LDLC/HDLC SERPL: 2.17 {RATIO}
LYMPHOCYTES # BLD AUTO: 1.65 10*3/MM3 (ref 0.7–3.1)
LYMPHOCYTES NFR BLD AUTO: 35 % (ref 19.6–45.3)
MCH RBC QN AUTO: 30 PG (ref 26.6–33)
MCHC RBC AUTO-ENTMCNC: 34.4 G/DL (ref 31.5–35.7)
MCV RBC AUTO: 87.1 FL (ref 79–97)
MONOCYTES # BLD AUTO: 0.49 10*3/MM3 (ref 0.1–0.9)
MONOCYTES NFR BLD AUTO: 10.4 % (ref 5–12)
NEUTROPHILS # BLD AUTO: 2.33 10*3/MM3 (ref 1.7–7)
NEUTROPHILS NFR BLD AUTO: 49.6 % (ref 42.7–76)
NRBC BLD AUTO-RTO: 0 /100 WBC (ref 0–0.2)
PLATELET # BLD AUTO: 147 10*3/MM3 (ref 140–450)
POTASSIUM SERPL-SCNC: 4.2 MMOL/L (ref 3.5–5.2)
PROT SERPL-MCNC: 6.5 G/DL (ref 6–8.5)
PSA SERPL-MCNC: 0.16 NG/ML (ref 0–4)
RBC # BLD AUTO: 5.67 10*6/MM3 (ref 4.14–5.8)
SODIUM SERPL-SCNC: 139 MMOL/L (ref 136–145)
TRIGL SERPL-MCNC: 103 MG/DL (ref 0–150)
TSH SERPL DL<=0.005 MIU/L-ACNC: 2 UIU/ML (ref 0.27–4.2)
VLDLC SERPL CALC-MCNC: 20.6 MG/DL
WBC # BLD AUTO: 4.71 10*3/MM3 (ref 3.4–10.8)

## 2020-01-24 ENCOUNTER — OFFICE VISIT (OUTPATIENT)
Dept: FAMILY MEDICINE CLINIC | Facility: CLINIC | Age: 55
End: 2020-01-24

## 2020-01-24 VITALS
HEIGHT: 69 IN | HEART RATE: 71 BPM | SYSTOLIC BLOOD PRESSURE: 148 MMHG | BODY MASS INDEX: 43.65 KG/M2 | DIASTOLIC BLOOD PRESSURE: 100 MMHG | OXYGEN SATURATION: 98 % | WEIGHT: 294.7 LBS | TEMPERATURE: 98.2 F

## 2020-01-24 DIAGNOSIS — E11.9 TYPE 2 DIABETES MELLITUS WITHOUT COMPLICATION, WITHOUT LONG-TERM CURRENT USE OF INSULIN (HCC): ICD-10-CM

## 2020-01-24 DIAGNOSIS — I10 ESSENTIAL HYPERTENSION: Primary | ICD-10-CM

## 2020-01-24 PROCEDURE — 99214 OFFICE O/P EST MOD 30 MIN: CPT | Performed by: NURSE PRACTITIONER

## 2020-01-24 NOTE — PROGRESS NOTES
"Subjective   Raymundo Fall is a 54 y.o. male   who presents for   Chief Complaint   Patient presents with   • Diabetes     4mo F/U     Glucose is 170, fasting    Torn meniscus around thanksgiving  Shoulder pain, right, states pain increased  Cortisone injections, PT, shoulder is much better, continuing gym  Torn meniscus, recent MRI, has surgery scheduled 02/04  States 4-6 week downtime from surgery, then plans to resume working out  Sees Dr. Cleaning, ortho    Diagnosed with OCD, with anxiety, states medication is not recommended, continuing therapy,     Started meloxicam, does have improvement  States has to stop two weeks    /100   Pulse 71   Temp 98.2 °F (36.8 °C)   Ht 174 cm (68.5\")   Wt 134 kg (294 lb 11.2 oz)   SpO2 98%   BMI 44.16 kg/m²       History of Present Illness   Diabetes   He presents for his follow-up diabetic visit. He has type 2 diabetes mellitus. His disease course has been stable. There are no hypoglycemic associated symptoms. Pertinent negatives for hypoglycemia include no headaches or sweats. There are no diabetic associated symptoms. Pertinent negatives for diabetes include no blurred vision and no chest pain. There are no hypoglycemic complications. Symptoms are stable. There are no diabetic complications.   Hypertension   This is a chronic problem. The current episode started more than 1 year ago. The problem has been gradually worsening since onset. The problem is uncontrolled. Pertinent negatives include no anxiety, blurred vision, chest pain, headaches, malaise/fatigue, neck pain, orthopnea, palpitations, peripheral edema, PND, shortness of breath or sweats. There are no associated agents to hypertension. Risk factors for coronary artery disease include diabetes mellitus, dyslipidemia, obesity and male gender. Past treatments include angiotensin blockers and calcium channel blockers. Current antihypertension treatment includes angiotensin blockers and diuretics. The " current treatment provides moderate improvement. There are no compliance problems.        The following portions of the patient's history were reviewed and updated as appropriate: allergies, current medications, past family history, past medical history, past social history, past surgical history and problem list.    Review of Systems  Review of Systems   Constitutional: Positive for activity change (decreased). Negative for appetite change and malaise/fatigue.   HENT: Negative.    Eyes: Negative.  Negative for blurred vision.   Respiratory: Negative.  Negative for shortness of breath.    Cardiovascular: Negative.  Negative for chest pain, palpitations, orthopnea and PND.   Gastrointestinal: Negative.    Endocrine: Negative.    Genitourinary: Negative.    Musculoskeletal: Positive for arthralgias (right knee). Negative for neck pain.   Skin: Negative.    Allergic/Immunologic: Negative.    Neurological: Negative.  Negative for headaches.   Hematological: Negative.    Psychiatric/Behavioral: Negative.        Objective   Physical Exam  Physical Exam   Constitutional: He is oriented to person, place, and time. He appears well-developed and well-nourished. No distress.   Neck: Neck supple.   Cardiovascular: Normal rate, regular rhythm and normal heart sounds. Exam reveals no gallop and no friction rub.   No murmur heard.  Pulmonary/Chest: Effort normal and breath sounds normal. No respiratory distress. He has no wheezes. He has no rales.   Abdominal: Soft. Bowel sounds are normal. He exhibits no distension. There is no tenderness.   Neurological: He is alert and oriented to person, place, and time.   Skin: Skin is warm and dry. He is not diaphoretic.   Psychiatric: He has a normal mood and affect.         Assessment/Plan   Raymundo was seen today for diabetes.    Diagnoses and all orders for this visit:    Essential hypertension    Type 2 diabetes mellitus without complication, without long-term current use of insulin  (CMS/[facility])    will continue to monitor labs  A1C is increased slightly, pt unable to exercise currently, off work, more sedentary secondary to meniscus tear, scheduled for surgery on 02/04  Will monitor labs moving forward, patient to monitor diet, recommend no concentrated sweets, discussed delayed healing with elevated glucose  bp is elevated today, usually well controlled, no changes at this time, patient to monitor, decrease sodium, call if continued elevation

## 2020-01-29 ENCOUNTER — APPOINTMENT (OUTPATIENT)
Dept: PREADMISSION TESTING | Facility: HOSPITAL | Age: 55
End: 2020-01-29

## 2020-01-29 VITALS
DIASTOLIC BLOOD PRESSURE: 86 MMHG | OXYGEN SATURATION: 94 % | WEIGHT: 292 LBS | HEART RATE: 88 BPM | TEMPERATURE: 97.5 F | BODY MASS INDEX: 43.25 KG/M2 | HEIGHT: 69 IN | RESPIRATION RATE: 20 BRPM | SYSTOLIC BLOOD PRESSURE: 143 MMHG

## 2020-01-29 LAB
BACTERIA UR QL AUTO: NORMAL /HPF
BILIRUB UR QL STRIP: NEGATIVE
CLARITY UR: CLEAR
COLOR UR: YELLOW
GLUCOSE UR STRIP-MCNC: ABNORMAL MG/DL
HGB UR QL STRIP.AUTO: NEGATIVE
HYALINE CASTS UR QL AUTO: NORMAL /LPF
KETONES UR QL STRIP: ABNORMAL
LEUKOCYTE ESTERASE UR QL STRIP.AUTO: NEGATIVE
NITRITE UR QL STRIP: NEGATIVE
PH UR STRIP.AUTO: <=5 [PH] (ref 5–8)
PROT UR QL STRIP: NEGATIVE
RBC # UR: NORMAL /HPF
REF LAB TEST METHOD: NORMAL
SP GR UR STRIP: >=1.03 (ref 1–1.03)
SQUAMOUS #/AREA URNS HPF: NORMAL /HPF
UROBILINOGEN UR QL STRIP: ABNORMAL
WBC UR QL AUTO: NORMAL /HPF

## 2020-01-29 PROCEDURE — 93005 ELECTROCARDIOGRAM TRACING: CPT

## 2020-01-29 PROCEDURE — 93010 ELECTROCARDIOGRAM REPORT: CPT | Performed by: INTERNAL MEDICINE

## 2020-01-29 PROCEDURE — 81001 URINALYSIS AUTO W/SCOPE: CPT | Performed by: ORTHOPAEDIC SURGERY

## 2020-02-04 ENCOUNTER — ANESTHESIA EVENT (OUTPATIENT)
Dept: PERIOP | Facility: HOSPITAL | Age: 55
End: 2020-02-04

## 2020-02-04 ENCOUNTER — HOSPITAL ENCOUNTER (OUTPATIENT)
Facility: HOSPITAL | Age: 55
Setting detail: HOSPITAL OUTPATIENT SURGERY
Discharge: HOME | End: 2020-02-04
Attending: ORTHOPAEDIC SURGERY | Admitting: ORTHOPAEDIC SURGERY

## 2020-02-04 ENCOUNTER — ANESTHESIA (OUTPATIENT)
Dept: PERIOP | Facility: HOSPITAL | Age: 55
End: 2020-02-04

## 2020-02-04 VITALS
DIASTOLIC BLOOD PRESSURE: 96 MMHG | RESPIRATION RATE: 18 BRPM | HEART RATE: 78 BPM | TEMPERATURE: 97.8 F | SYSTOLIC BLOOD PRESSURE: 148 MMHG | OXYGEN SATURATION: 96 %

## 2020-02-04 LAB — GLUCOSE BLDC GLUCOMTR-MCNC: 183 MG/DL (ref 70–130)

## 2020-02-04 PROCEDURE — 25010000002 EPINEPHRINE PER 0.1 MG: Performed by: ORTHOPAEDIC SURGERY

## 2020-02-04 PROCEDURE — 25010000002 KETOROLAC TROMETHAMINE PER 15 MG: Performed by: NURSE ANESTHETIST, CERTIFIED REGISTERED

## 2020-02-04 PROCEDURE — 25010000003 CEFAZOLIN IN DEXTROSE 2-4 GM/100ML-% SOLUTION: Performed by: ORTHOPAEDIC SURGERY

## 2020-02-04 PROCEDURE — 82962 GLUCOSE BLOOD TEST: CPT

## 2020-02-04 PROCEDURE — 25010000002 FENTANYL CITRATE (PF) 100 MCG/2ML SOLUTION: Performed by: NURSE ANESTHETIST, CERTIFIED REGISTERED

## 2020-02-04 PROCEDURE — 25010000002 ONDANSETRON PER 1 MG: Performed by: NURSE ANESTHETIST, CERTIFIED REGISTERED

## 2020-02-04 PROCEDURE — 25010000002 HYDROMORPHONE PER 4 MG: Performed by: NURSE ANESTHETIST, CERTIFIED REGISTERED

## 2020-02-04 PROCEDURE — 25010000002 PROPOFOL 10 MG/ML EMULSION: Performed by: NURSE ANESTHETIST, CERTIFIED REGISTERED

## 2020-02-04 RX ORDER — HYDRALAZINE HYDROCHLORIDE 20 MG/ML
5 INJECTION INTRAMUSCULAR; INTRAVENOUS
Status: DISCONTINUED | OUTPATIENT
Start: 2020-02-04 | End: 2020-02-04 | Stop reason: HOSPADM

## 2020-02-04 RX ORDER — DIPHENHYDRAMINE HYDROCHLORIDE 50 MG/ML
12.5 INJECTION INTRAMUSCULAR; INTRAVENOUS
Status: DISCONTINUED | OUTPATIENT
Start: 2020-02-04 | End: 2020-02-04 | Stop reason: HOSPADM

## 2020-02-04 RX ORDER — SODIUM CHLORIDE, SODIUM LACTATE, POTASSIUM CHLORIDE, AND CALCIUM CHLORIDE .6; .31; .03; .02 G/100ML; G/100ML; G/100ML; G/100ML
INJECTION, SOLUTION INTRAVENOUS AS NEEDED
Status: DISCONTINUED | OUTPATIENT
Start: 2020-02-04 | End: 2020-02-04 | Stop reason: HOSPADM

## 2020-02-04 RX ORDER — KETOROLAC TROMETHAMINE 30 MG/ML
INJECTION, SOLUTION INTRAMUSCULAR; INTRAVENOUS AS NEEDED
Status: DISCONTINUED | OUTPATIENT
Start: 2020-02-04 | End: 2020-02-04 | Stop reason: SURG

## 2020-02-04 RX ORDER — DIPHENHYDRAMINE HCL 25 MG
25 CAPSULE ORAL
Status: DISCONTINUED | OUTPATIENT
Start: 2020-02-04 | End: 2020-02-04 | Stop reason: HOSPADM

## 2020-02-04 RX ORDER — NALOXONE HCL 0.4 MG/ML
0.2 VIAL (ML) INJECTION AS NEEDED
Status: DISCONTINUED | OUTPATIENT
Start: 2020-02-04 | End: 2020-02-04 | Stop reason: HOSPADM

## 2020-02-04 RX ORDER — ACETAMINOPHEN 325 MG/1
650 TABLET ORAL ONCE AS NEEDED
Status: DISCONTINUED | OUTPATIENT
Start: 2020-02-04 | End: 2020-02-04 | Stop reason: HOSPADM

## 2020-02-04 RX ORDER — FAMOTIDINE 10 MG/ML
20 INJECTION, SOLUTION INTRAVENOUS ONCE
Status: COMPLETED | OUTPATIENT
Start: 2020-02-04 | End: 2020-02-04

## 2020-02-04 RX ORDER — MIDAZOLAM HYDROCHLORIDE 1 MG/ML
1 INJECTION INTRAMUSCULAR; INTRAVENOUS
Status: DISCONTINUED | OUTPATIENT
Start: 2020-02-04 | End: 2020-02-04 | Stop reason: HOSPADM

## 2020-02-04 RX ORDER — MIDAZOLAM HYDROCHLORIDE 1 MG/ML
2 INJECTION INTRAMUSCULAR; INTRAVENOUS
Status: DISCONTINUED | OUTPATIENT
Start: 2020-02-04 | End: 2020-02-04 | Stop reason: HOSPADM

## 2020-02-04 RX ORDER — EPHEDRINE SULFATE 50 MG/ML
5 INJECTION, SOLUTION INTRAVENOUS ONCE AS NEEDED
Status: DISCONTINUED | OUTPATIENT
Start: 2020-02-04 | End: 2020-02-04 | Stop reason: HOSPADM

## 2020-02-04 RX ORDER — PROMETHAZINE HYDROCHLORIDE 25 MG/ML
12.5 INJECTION, SOLUTION INTRAMUSCULAR; INTRAVENOUS ONCE AS NEEDED
Status: DISCONTINUED | OUTPATIENT
Start: 2020-02-04 | End: 2020-02-04 | Stop reason: HOSPADM

## 2020-02-04 RX ORDER — SODIUM CHLORIDE 0.9 % (FLUSH) 0.9 %
3-10 SYRINGE (ML) INJECTION AS NEEDED
Status: DISCONTINUED | OUTPATIENT
Start: 2020-02-04 | End: 2020-02-04 | Stop reason: HOSPADM

## 2020-02-04 RX ORDER — FENTANYL CITRATE 50 UG/ML
50 INJECTION, SOLUTION INTRAMUSCULAR; INTRAVENOUS
Status: DISCONTINUED | OUTPATIENT
Start: 2020-02-04 | End: 2020-02-04 | Stop reason: HOSPADM

## 2020-02-04 RX ORDER — ACETAMINOPHEN 650 MG/1
650 SUPPOSITORY RECTAL ONCE AS NEEDED
Status: DISCONTINUED | OUTPATIENT
Start: 2020-02-04 | End: 2020-02-04 | Stop reason: HOSPADM

## 2020-02-04 RX ORDER — HYDROMORPHONE HYDROCHLORIDE 1 MG/ML
0.5 INJECTION, SOLUTION INTRAMUSCULAR; INTRAVENOUS; SUBCUTANEOUS
Status: DISCONTINUED | OUTPATIENT
Start: 2020-02-04 | End: 2020-02-04 | Stop reason: HOSPADM

## 2020-02-04 RX ORDER — HYDROCODONE BITARTRATE AND ACETAMINOPHEN 7.5; 325 MG/1; MG/1
1 TABLET ORAL ONCE AS NEEDED
Status: DISCONTINUED | OUTPATIENT
Start: 2020-02-04 | End: 2020-02-04 | Stop reason: HOSPADM

## 2020-02-04 RX ORDER — LIDOCAINE HYDROCHLORIDE 10 MG/ML
0.5 INJECTION, SOLUTION EPIDURAL; INFILTRATION; INTRACAUDAL; PERINEURAL ONCE AS NEEDED
Status: DISCONTINUED | OUTPATIENT
Start: 2020-02-04 | End: 2020-02-04 | Stop reason: HOSPADM

## 2020-02-04 RX ORDER — PROPOFOL 10 MG/ML
VIAL (ML) INTRAVENOUS AS NEEDED
Status: DISCONTINUED | OUTPATIENT
Start: 2020-02-04 | End: 2020-02-04 | Stop reason: SURG

## 2020-02-04 RX ORDER — BUPIVACAINE HYDROCHLORIDE AND EPINEPHRINE 5; 5 MG/ML; UG/ML
INJECTION, SOLUTION PERINEURAL AS NEEDED
Status: DISCONTINUED | OUTPATIENT
Start: 2020-02-04 | End: 2020-02-04 | Stop reason: HOSPADM

## 2020-02-04 RX ORDER — PROMETHAZINE HYDROCHLORIDE 25 MG/1
25 SUPPOSITORY RECTAL ONCE AS NEEDED
Status: DISCONTINUED | OUTPATIENT
Start: 2020-02-04 | End: 2020-02-04 | Stop reason: HOSPADM

## 2020-02-04 RX ORDER — PROMETHAZINE HYDROCHLORIDE 25 MG/1
25 TABLET ORAL ONCE AS NEEDED
Status: DISCONTINUED | OUTPATIENT
Start: 2020-02-04 | End: 2020-02-04 | Stop reason: HOSPADM

## 2020-02-04 RX ORDER — ONDANSETRON 2 MG/ML
4 INJECTION INTRAMUSCULAR; INTRAVENOUS ONCE AS NEEDED
Status: COMPLETED | OUTPATIENT
Start: 2020-02-04 | End: 2020-02-04

## 2020-02-04 RX ORDER — FLUMAZENIL 0.1 MG/ML
0.2 INJECTION INTRAVENOUS AS NEEDED
Status: DISCONTINUED | OUTPATIENT
Start: 2020-02-04 | End: 2020-02-04 | Stop reason: HOSPADM

## 2020-02-04 RX ORDER — CEFAZOLIN SODIUM 2 G/100ML
2 INJECTION, SOLUTION INTRAVENOUS ONCE
Status: COMPLETED | OUTPATIENT
Start: 2020-02-04 | End: 2020-02-04

## 2020-02-04 RX ORDER — FENTANYL CITRATE 50 UG/ML
INJECTION, SOLUTION INTRAMUSCULAR; INTRAVENOUS AS NEEDED
Status: DISCONTINUED | OUTPATIENT
Start: 2020-02-04 | End: 2020-02-04 | Stop reason: SURG

## 2020-02-04 RX ORDER — SODIUM CHLORIDE 0.9 % (FLUSH) 0.9 %
3 SYRINGE (ML) INJECTION EVERY 12 HOURS SCHEDULED
Status: DISCONTINUED | OUTPATIENT
Start: 2020-02-04 | End: 2020-02-04 | Stop reason: HOSPADM

## 2020-02-04 RX ORDER — OXYCODONE AND ACETAMINOPHEN 7.5; 325 MG/1; MG/1
1 TABLET ORAL ONCE AS NEEDED
Status: COMPLETED | OUTPATIENT
Start: 2020-02-04 | End: 2020-02-04

## 2020-02-04 RX ORDER — PROMETHAZINE HYDROCHLORIDE 25 MG/ML
6.25 INJECTION, SOLUTION INTRAMUSCULAR; INTRAVENOUS
Status: DISCONTINUED | OUTPATIENT
Start: 2020-02-04 | End: 2020-02-04 | Stop reason: HOSPADM

## 2020-02-04 RX ORDER — SODIUM CHLORIDE, SODIUM LACTATE, POTASSIUM CHLORIDE, CALCIUM CHLORIDE 600; 310; 30; 20 MG/100ML; MG/100ML; MG/100ML; MG/100ML
9 INJECTION, SOLUTION INTRAVENOUS CONTINUOUS
Status: DISCONTINUED | OUTPATIENT
Start: 2020-02-04 | End: 2020-02-04 | Stop reason: HOSPADM

## 2020-02-04 RX ORDER — LABETALOL HYDROCHLORIDE 5 MG/ML
5 INJECTION, SOLUTION INTRAVENOUS
Status: DISCONTINUED | OUTPATIENT
Start: 2020-02-04 | End: 2020-02-04 | Stop reason: HOSPADM

## 2020-02-04 RX ADMIN — HYDROMORPHONE HYDROCHLORIDE 0.5 MG: 1 INJECTION, SOLUTION INTRAMUSCULAR; INTRAVENOUS; SUBCUTANEOUS at 15:15

## 2020-02-04 RX ADMIN — SODIUM CHLORIDE, POTASSIUM CHLORIDE, SODIUM LACTATE AND CALCIUM CHLORIDE 9 ML/HR: 600; 310; 30; 20 INJECTION, SOLUTION INTRAVENOUS at 12:15

## 2020-02-04 RX ADMIN — KETOROLAC TROMETHAMINE 30 MG: 30 INJECTION, SOLUTION INTRAMUSCULAR; INTRAVENOUS at 15:06

## 2020-02-04 RX ADMIN — CEFAZOLIN SODIUM 3 G: 2 INJECTION, SOLUTION INTRAVENOUS at 14:22

## 2020-02-04 RX ADMIN — FENTANYL CITRATE 50 MCG: 50 INJECTION, SOLUTION INTRAMUSCULAR; INTRAVENOUS at 15:20

## 2020-02-04 RX ADMIN — PROPOFOL 200 MG: 10 INJECTION, EMULSION INTRAVENOUS at 14:24

## 2020-02-04 RX ADMIN — FENTANYL CITRATE 50 MCG: 50 INJECTION, SOLUTION INTRAMUSCULAR; INTRAVENOUS at 15:10

## 2020-02-04 RX ADMIN — OXYCODONE HYDROCHLORIDE AND ACETAMINOPHEN 1 TABLET: 7.5; 325 TABLET ORAL at 15:10

## 2020-02-04 RX ADMIN — FENTANYL CITRATE 50 MCG: 50 INJECTION INTRAMUSCULAR; INTRAVENOUS at 14:38

## 2020-02-04 RX ADMIN — ONDANSETRON 4 MG: 2 INJECTION INTRAMUSCULAR; INTRAVENOUS at 15:10

## 2020-02-04 RX ADMIN — FENTANYL CITRATE 50 MCG: 50 INJECTION INTRAMUSCULAR; INTRAVENOUS at 14:24

## 2020-02-04 RX ADMIN — SODIUM CHLORIDE, POTASSIUM CHLORIDE, SODIUM LACTATE AND CALCIUM CHLORIDE: 600; 310; 30; 20 INJECTION, SOLUTION INTRAVENOUS at 14:23

## 2020-02-04 RX ADMIN — FAMOTIDINE 20 MG: 10 INJECTION INTRAVENOUS at 12:20

## 2020-02-04 NOTE — ANESTHESIA POSTPROCEDURE EVALUATION
Patient: Raymundo Fall    Procedure Summary     Date:  02/04/20 Room / Location:   [facility]    Anesthesia Start:  1420 Anesthesia Stop:  1508    Procedure:  KNEE ARTHROSCOPY PARTIAL MEDIAL MENISECTOMY AND CHONDROPLASTY (Right Knee) Diagnosis:      Surgeon:  Denis Cleaning MD Provider:  Sabas Rolon MD    Anesthesia Type:  general ASA Status:  3          Anesthesia Type: general    Vitals  Vitals Value Taken Time   /95 2/4/2020  3:45 PM   Temp 36.6 °C (97.9 °F) 2/4/2020  3:05 PM   Pulse 73 2/4/2020  3:58 PM   Resp 18 2/4/2020  3:30 PM   SpO2 90 % 2/4/2020  3:58 PM   Vitals shown include unvalidated device data.        Post Anesthesia Care and Evaluation    Patient location during evaluation: bedside  Patient participation: complete - patient participated  Level of consciousness: awake  Pain score: 2  Pain management: adequate  Airway patency: patent  Anesthetic complications: No anesthetic complications  PONV Status: none  Cardiovascular status: acceptable  Respiratory status: acceptable  Hydration status: acceptable    Comments: /91 (BP Location: Left arm, Patient Position: Lying)   Pulse 72   Temp 36.6 °C (97.9 °F) (Oral)   Resp 18   SpO2 98%

## 2020-02-04 NOTE — BRIEF OP NOTE
KNEE ARTHROSCOPY  Progress Note    Raymundo Fall  2/4/2020    Pre-op Diagnosis:   Lt mmt, djd       Post-Op Diagnosis Codes:   same    Procedure/CPT® Codes:      Procedure(s):  KNEE ARTHROSCOPY PARTIAL MEDIAL MENISECTOMY AND CHONDROPLASTY    Surgeon(s):  Denis Cleaning MD    Anesthesia: General    Staff:   Circulator: Ellen Valdez RN  Scrub Person: Rafia Medrano    Estimated Blood Loss: minimal    Urine Voided: * No values recorded between 2/4/2020  2:20 PM and 2/4/2020  2:50 PM *    Specimens:                None          Drains: * No LDAs found *    Findings: above    Complications: none known      TYRON Cleaning MD     Date: 2/4/2020  Time: 2:50 PM

## 2020-02-04 NOTE — ANESTHESIA PREPROCEDURE EVALUATION
Anesthesia Evaluation     Patient summary reviewed and Nursing notes reviewed   NPO Solid Status: > 8 hours  NPO Liquid Status: > 2 hours           Airway   Mallampati: II  TM distance: >3 FB  Neck ROM: full  Dental - normal exam     Pulmonary - normal exam   (+) sleep apnea on CPAP,   Cardiovascular - normal exam    (+) hypertension,       Neuro/Psych  (+) headaches, numbness,     GI/Hepatic/Renal/Endo    (+) morbid obesity,  liver disease fatty liver disease, renal disease stones, diabetes mellitus type 2,     Musculoskeletal     (+) back pain,   Abdominal    Substance History - negative use     OB/GYN negative ob/gyn ROS         Other   arthritis,                      Anesthesia Plan    ASA 3     general       Anesthetic plan, all risks, benefits, and alternatives have been provided, discussed and informed consent has been obtained with: patient.

## 2020-02-04 NOTE — ANESTHESIA PROCEDURE NOTES
Airway  Urgency: elective    Date/Time: 2/4/2020 2:26 PM  Airway not difficult    General Information and Staff    Patient location during procedure: OR  Anesthesiologist: Sabas Rolon MD  CRNA: Tara Monroy CRNA    Indications and Patient Condition  Indications for airway management: airway protection    Preoxygenated: yes  MILS not maintained throughout  Mask difficulty assessment: 1 - vent by mask    Final Airway Details  Final airway type: supraglottic airway      Successful airway: classic  Size 5    Number of attempts at approach: 1    Additional Comments  Preoxygenation FEO2 >85, SIVI, LMA placed with ease, teeth/lips as preop. Secured and placement confirmed.

## 2020-02-26 ENCOUNTER — TELEPHONE (OUTPATIENT)
Dept: FAMILY MEDICINE CLINIC | Facility: CLINIC | Age: 55
End: 2020-02-26

## 2020-02-26 NOTE — TELEPHONE ENCOUNTER
[facility] CALLED FOR NEW PRESCRIPTION    CPAP SUPPLIES   WILL BE SENDING FAX FOR REQUEST AND AUTHORIZATION

## 2020-03-02 ENCOUNTER — TELEPHONE (OUTPATIENT)
Dept: FAMILY MEDICINE CLINIC | Facility: CLINIC | Age: 55
End: 2020-03-02

## 2020-03-02 NOTE — TELEPHONE ENCOUNTER
PT was 148/100 at last appt. You stated   bp is elevated today, usually well controlled, no changes at this time, patient to monitor, decrease sodium, call if continued elevation        Please advise your thoughts

## 2020-03-02 NOTE — TELEPHONE ENCOUNTER
PT CALLED IN REGARDS TO SOME BP ISSUES HE  HAS BEEN HAVING. HE STATES THAT HS BP HAS BEEN AROUND 156/103 AND HIS PULSE 98/102.THE PATIENT STATES HE MAY HAVE TO HAVE HIS BP MEDICATION ADJUSTED AND WOULD LIKE TO GET A CALL BACK IN REGARDS TO THIS MATTER. PLEASE ADVISE AND CALL PT BACK [phone].

## 2020-03-03 RX ORDER — AMLODIPINE BESYLATE 10 MG/1
10 TABLET ORAL DAILY
Qty: 90 TABLET | Refills: 1 | Status: SHIPPED | OUTPATIENT
Start: 2020-03-03 | End: 2020-03-03

## 2020-03-03 RX ORDER — AMLODIPINE BESYLATE 10 MG/1
10 TABLET ORAL DAILY
Qty: 90 TABLET | Refills: 1 | Status: SHIPPED | OUTPATIENT
Start: 2020-03-03 | End: 2020-09-22

## 2020-03-03 NOTE — TELEPHONE ENCOUNTER
Spoke with the patient and advised him the a new Rx was sent in and to monitor his Bp for the next few weeks and to send a message through [employer].

## 2020-03-03 NOTE — TELEPHONE ENCOUNTER
Have patient increase amlodipine to 10 mg, new rx sent to [facility] pharmacy,  Monitor bp over next few weeks, send my chart message with averages.

## 2020-03-05 ENCOUNTER — TELEPHONE (OUTPATIENT)
Dept: FAMILY MEDICINE CLINIC | Facility: CLINIC | Age: 55
End: 2020-03-05

## 2020-03-05 RX ORDER — LOSARTAN POTASSIUM AND HYDROCHLOROTHIAZIDE 12.5; 5 MG/1; MG/1
1 TABLET ORAL DAILY
Qty: 90 TABLET | Refills: 1 | Status: SHIPPED | OUTPATIENT
Start: 2020-03-05 | End: 2020-03-05 | Stop reason: SDUPTHER

## 2020-03-05 RX ORDER — LOSARTAN POTASSIUM AND HYDROCHLOROTHIAZIDE 12.5; 5 MG/1; MG/1
1 TABLET ORAL DAILY
Qty: 90 TABLET | Refills: 1 | Status: SHIPPED | OUTPATIENT
Start: 2020-03-05 | End: 2020-09-02

## 2020-03-05 NOTE — TELEPHONE ENCOUNTER
PT called saying that the losartan-hydrochlorothiazide (HYZAAR) 50-12.5 MG per tablet was sent to the wrong pharmacy. Can it be sent to [address]?    [address] [place], [place]

## 2020-03-06 ENCOUNTER — TELEPHONE (OUTPATIENT)
Dept: FAMILY MEDICINE CLINIC | Facility: CLINIC | Age: 55
End: 2020-03-06

## 2020-03-06 RX ORDER — DAPAGLIFLOZIN 10 MG/1
TABLET, FILM COATED ORAL
Qty: 90 TABLET | Refills: 0 | Status: SHIPPED | OUTPATIENT
Start: 2020-03-06 | End: 2020-06-04

## 2020-03-06 RX ORDER — MELOXICAM 7.5 MG/1
TABLET ORAL
Qty: 30 TABLET | Refills: 0 | Status: SHIPPED | OUTPATIENT
Start: 2020-03-06 | End: 2020-04-08

## 2020-03-06 RX ORDER — SITAGLIPTIN 100 MG/1
TABLET, FILM COATED ORAL
Qty: 30 TABLET | Refills: 2 | Status: SHIPPED | OUTPATIENT
Start: 2020-03-06 | End: 2020-05-28

## 2020-03-06 NOTE — TELEPHONE ENCOUNTER
PATIENT CALLED AND STATES HIS MEDICATION   losartan-hydrochlorothiazide (HYZAAR) 50-12.5 MG per tablet  HAS BEEN SENT TO [facility]. HE NEVER HAS HAD A PRESCRIPTION FILLED THERE BEFORE. HIS PHARMACY IS [facility] IN [place]  ON [facility]. [facility] WILL NOT RELEASE IT TO BE SENT TO [facility]. PLEASE RESEND TO [facility]  [phone]      DR WU NEEDS TO CANCEL  IT FROM [facility] AND RESENT TO [facility]    PATIENT CALL BACK NUMBER [phone]

## 2020-04-08 RX ORDER — MELOXICAM 7.5 MG/1
7.5 TABLET ORAL DAILY
Qty: 30 TABLET | Refills: 5 | OUTPATIENT
Start: 2020-04-08

## 2020-04-08 RX ORDER — MELOXICAM 7.5 MG/1
TABLET ORAL
Qty: 30 TABLET | Refills: 5 | Status: SHIPPED | OUTPATIENT
Start: 2020-04-08 | End: 2020-09-29

## 2020-05-19 ENCOUNTER — RESULTS ENCOUNTER (OUTPATIENT)
Dept: FAMILY MEDICINE CLINIC | Facility: CLINIC | Age: 55
End: 2020-05-19

## 2020-05-19 DIAGNOSIS — E11.9 TYPE 2 DIABETES MELLITUS WITHOUT COMPLICATION, WITHOUT LONG-TERM CURRENT USE OF INSULIN (HCC): ICD-10-CM

## 2020-05-19 DIAGNOSIS — I10 ESSENTIAL HYPERTENSION: ICD-10-CM

## 2020-05-19 DIAGNOSIS — E11.9 TYPE 2 DIABETES MELLITUS WITHOUT COMPLICATION, WITHOUT LONG-TERM CURRENT USE OF INSULIN (HCC): Primary | ICD-10-CM

## 2020-05-27 ENCOUNTER — OFFICE VISIT (OUTPATIENT)
Dept: FAMILY MEDICINE CLINIC | Facility: CLINIC | Age: 55
End: 2020-05-27

## 2020-05-27 VITALS
WEIGHT: 291 LBS | DIASTOLIC BLOOD PRESSURE: 88 MMHG | SYSTOLIC BLOOD PRESSURE: 136 MMHG | HEART RATE: 83 BPM | OXYGEN SATURATION: 96 % | RESPIRATION RATE: 16 BRPM | HEIGHT: 69 IN | TEMPERATURE: 98.4 F | BODY MASS INDEX: 43.1 KG/M2

## 2020-05-27 DIAGNOSIS — E11.9 TYPE 2 DIABETES MELLITUS WITHOUT COMPLICATION, WITHOUT LONG-TERM CURRENT USE OF INSULIN (HCC): Primary | ICD-10-CM

## 2020-05-27 PROCEDURE — 99213 OFFICE O/P EST LOW 20 MIN: CPT | Performed by: NURSE PRACTITIONER

## 2020-05-27 NOTE — PROGRESS NOTES
"Subjective   Raymundo Fall is a 55 y.o. male   who presents for   Chief Complaint   Patient presents with   • Diabetes     4mo FU     Doing well, unable to exercise due to torn meniscus, returned to work in April, has been off May on vacation  Sister recently tested positive for covid, no direct contact, she was helping parents, they have since tested negative  He denies any symptoms at present time      /88   Pulse 83   Temp 98.4 °F (36.9 °C)   Resp 16   Ht 175.3 cm (69\")   Wt 132 kg (291 lb)   SpO2 96%   BMI 42.97 kg/m²       History of Present Illness   Diabetes   He presents for his follow-up diabetic visit. He has type 2 diabetes mellitus. His disease course has been stable. There are no hypoglycemic associated symptoms. There are no diabetic associated symptoms. There are no hypoglycemic complications. Symptoms are stable. His weight is stable. He is following a generally healthy diet. He rarely participates in exercise. An ACE inhibitor/angiotensin II receptor blocker is being taken. Eye exam is current.       The following portions of the patient's history were reviewed and updated as appropriate: allergies, current medications, past family history, past medical history, past social history, past surgical history and problem list.    Review of Systems  Review of Systems   Constitutional: Negative.    HENT: Negative.    Eyes: Negative.    Respiratory: Negative.    Cardiovascular: Negative.    Gastrointestinal: Negative.    Endocrine: Negative.    Genitourinary: Negative.    Musculoskeletal: Negative.    Skin: Negative.    Allergic/Immunologic: Negative.    Neurological: Negative.    Hematological: Negative.    Psychiatric/Behavioral: Negative.        Objective   Physical Exam  Physical Exam   Constitutional: He is oriented to person, place, and time. He appears well-developed and well-nourished. No distress.   Cardiovascular: Normal rate, regular rhythm and normal heart sounds. Exam reveals " no gallop and no friction rub.   No murmur heard.  Pulmonary/Chest: Effort normal and breath sounds normal. No respiratory distress. He has no wheezes. He has no rales.   Neurological: He is alert and oriented to person, place, and time.   Skin: Skin is warm and dry. He is not diaphoretic.   Psychiatric: He has a normal mood and affect.         Assessment/Plan   Raymundo was seen today for diabetes.    Diagnoses and all orders for this visit:    Type 2 diabetes mellitus without complication, without long-term current use of insulin (CMS/[facility])    will obtain labs today, monitor A1C, consider ozempic, d/c januvia for continued elevation or no change  No hx of thyroid cancer or nodules  Discussed with patient and agreeable, may help with weight loss   Follow up in 4-6 months, pending med changes

## 2020-05-28 DIAGNOSIS — E11.9 TYPE 2 DIABETES MELLITUS WITHOUT COMPLICATION, WITHOUT LONG-TERM CURRENT USE OF INSULIN (HCC): Primary | ICD-10-CM

## 2020-05-28 LAB
ALBUMIN SERPL-MCNC: 4.8 G/DL (ref 3.5–5.2)
ALBUMIN/CREAT UR: <14 MG/G CREAT (ref 0–29)
ALBUMIN/GLOB SERPL: 1.8 G/DL
ALP SERPL-CCNC: 70 U/L (ref 39–117)
ALT SERPL-CCNC: 63 U/L (ref 1–41)
AST SERPL-CCNC: 30 U/L (ref 1–40)
BILIRUB SERPL-MCNC: 0.4 MG/DL (ref 0.2–1.2)
BUN SERPL-MCNC: 15 MG/DL (ref 6–20)
BUN/CREAT SERPL: 16.5 (ref 7–25)
CALCIUM SERPL-MCNC: 9.6 MG/DL (ref 8.6–10.5)
CHLORIDE SERPL-SCNC: 99 MMOL/L (ref 98–107)
CO2 SERPL-SCNC: 21.8 MMOL/L (ref 22–29)
CREAT SERPL-MCNC: 0.91 MG/DL (ref 0.76–1.27)
CREAT UR-MCNC: 22.2 MG/DL
GLOBULIN SER CALC-MCNC: 2.7 GM/DL
GLUCOSE SERPL-MCNC: 316 MG/DL (ref 65–99)
HBA1C MFR BLD: 8.9 % (ref 4.8–5.6)
MICROALBUMIN UR-MCNC: <3 UG/ML
POTASSIUM SERPL-SCNC: 4.1 MMOL/L (ref 3.5–5.2)
PROT SERPL-MCNC: 7.5 G/DL (ref 6–8.5)
SODIUM SERPL-SCNC: 135 MMOL/L (ref 136–145)

## 2020-06-04 RX ORDER — DAPAGLIFLOZIN 10 MG/1
TABLET, FILM COATED ORAL
Qty: 90 TABLET | Refills: 1 | Status: SHIPPED | OUTPATIENT
Start: 2020-06-04 | End: 2020-12-02

## 2020-09-02 RX ORDER — LOSARTAN POTASSIUM AND HYDROCHLOROTHIAZIDE 12.5; 5 MG/1; MG/1
TABLET ORAL
Qty: 90 TABLET | Refills: 1 | Status: SHIPPED | OUTPATIENT
Start: 2020-09-02 | End: 2021-03-04

## 2020-09-22 RX ORDER — AMLODIPINE BESYLATE 10 MG/1
TABLET ORAL
Qty: 90 TABLET | Refills: 1 | Status: SHIPPED | OUTPATIENT
Start: 2020-09-22 | End: 2021-03-30

## 2020-09-29 RX ORDER — MELOXICAM 7.5 MG/1
TABLET ORAL
Qty: 30 TABLET | Refills: 4 | Status: SHIPPED | OUTPATIENT
Start: 2020-09-29 | End: 2021-03-04

## 2020-10-16 ENCOUNTER — OFFICE VISIT (OUTPATIENT)
Dept: FAMILY MEDICINE CLINIC | Facility: CLINIC | Age: 55
End: 2020-10-16

## 2020-10-16 VITALS
WEIGHT: 288.7 LBS | HEART RATE: 73 BPM | SYSTOLIC BLOOD PRESSURE: 124 MMHG | TEMPERATURE: 97.5 F | DIASTOLIC BLOOD PRESSURE: 90 MMHG | HEIGHT: 69 IN | OXYGEN SATURATION: 99 % | BODY MASS INDEX: 42.76 KG/M2 | RESPIRATION RATE: 16 BRPM

## 2020-10-16 DIAGNOSIS — E11.9 TYPE 2 DIABETES MELLITUS WITHOUT COMPLICATION, WITHOUT LONG-TERM CURRENT USE OF INSULIN (HCC): Primary | ICD-10-CM

## 2020-10-16 DIAGNOSIS — I10 ESSENTIAL HYPERTENSION: ICD-10-CM

## 2020-10-16 PROCEDURE — 90686 IIV4 VACC NO PRSV 0.5 ML IM: CPT | Performed by: NURSE PRACTITIONER

## 2020-10-16 PROCEDURE — 99213 OFFICE O/P EST LOW 20 MIN: CPT | Performed by: NURSE PRACTITIONER

## 2020-10-16 PROCEDURE — 90471 IMMUNIZATION ADMIN: CPT | Performed by: NURSE PRACTITIONER

## 2020-10-16 NOTE — PROGRESS NOTES
"Subjective   Raymundo Fall is a 55 y.o. male   who presents for   Chief Complaint   Patient presents with   • Hypertension     FU   • Diabetes           /90   Pulse 73   Temp 97.5 °F (36.4 °C)   Resp 16   Ht 175.3 cm (69\")   Wt 131 kg (288 lb 11.2 oz)   SpO2 99%   BMI 42.63 kg/m²       History of Present Illness   Hypertension  This is a chronic problem. The current episode started more than 1 year ago. The problem has been gradually improving since onset. The problem is controlled. Pertinent negatives include no anxiety, blurred vision, chest pain, headaches, malaise/fatigue, neck pain, orthopnea, palpitations, peripheral edema, PND, shortness of breath or sweats. There are no associated agents to hypertension. Risk factors for coronary artery disease include diabetes mellitus and male gender. Past treatments include angiotensin blockers and diuretics. Current antihypertension treatment includes angiotensin blockers and diuretics. The current treatment provides significant improvement. Compliance problems include diet and exercise.    Diabetes  He presents for his follow-up diabetic visit. He has type 2 diabetes mellitus. His disease course has been stable. There are no hypoglycemic associated symptoms. Pertinent negatives for hypoglycemia include no headaches or sweats. There are no diabetic associated symptoms. Pertinent negatives for diabetes include no blurred vision and no chest pain. There are no hypoglycemic complications. Symptoms are stable. There are no diabetic complications. Risk factors for coronary artery disease include diabetes mellitus, hypertension, male sex and obesity. Current diabetic treatment includes oral agent (triple therapy). He is compliant with treatment most of the time. Weight trend: decreasing slowly since starting ozempic. He is following a diabetic diet. His home blood glucose trend is decreasing steadily. His breakfast blood glucose range is generally 140-180 " mg/dl. An ACE inhibitor/angiotensin II receptor blocker is being taken. He does not see a podiatrist.Eye exam is current.       The following portions of the patient's history were reviewed and updated as appropriate: allergies, current medications, past family history, past medical history, past social history, past surgical history and problem list.    Review of Systems  Review of Systems   Constitutional: Negative.  Negative for malaise/fatigue.   HENT: Negative.    Eyes: Negative.  Negative for blurred vision.   Respiratory: Negative.  Negative for shortness of breath.    Cardiovascular: Negative.  Negative for chest pain, palpitations, orthopnea and PND.   Gastrointestinal: Negative.    Endocrine: Negative.    Genitourinary: Negative.    Musculoskeletal: Negative.  Negative for neck pain.   Skin: Negative.    Allergic/Immunologic: Negative.    Neurological: Negative.  Negative for headaches.   Hematological: Negative.    Psychiatric/Behavioral: Negative.        Objective   Physical Exam  Physical Exam  Constitutional:       General: He is not in acute distress.     Appearance: He is well-developed. He is not diaphoretic.   Neck:      Musculoskeletal: Neck supple.   Cardiovascular:      Rate and Rhythm: Normal rate and regular rhythm.      Heart sounds: Normal heart sounds. No murmur. No friction rub. No gallop.    Pulmonary:      Effort: Pulmonary effort is normal. No respiratory distress.      Breath sounds: Normal breath sounds. No wheezing or rales.   Abdominal:      General: Bowel sounds are normal. There is no distension.      Palpations: Abdomen is soft.      Tenderness: There is no abdominal tenderness.   Skin:     General: Skin is warm and dry.   Neurological:      Mental Status: He is alert and oriented to person, place, and time.           Assessment/Plan   Diagnoses and all orders for this visit:    1. Type 2 diabetes mellitus without complication, without long-term current use of insulin (CMS/formerly [facility])  (Primary)  -     Hemoglobin A1c  -     Comprehensive metabolic panel    2. Essential hypertension  -     Hemoglobin A1c  -     Comprehensive metabolic panel    will obtain labs today, consider maximizing dose of ozempic based on A1C, has lost 5 lbs per scale at home, bp is improved over past several months  Follow up in 4 months, sooner if needed

## 2020-10-17 LAB
ALBUMIN SERPL-MCNC: 4.4 G/DL (ref 3.5–5.2)
ALBUMIN/GLOB SERPL: 1.8 G/DL
ALP SERPL-CCNC: 69 U/L (ref 39–117)
ALT SERPL-CCNC: 59 U/L (ref 1–41)
AST SERPL-CCNC: 34 U/L (ref 1–40)
BILIRUB SERPL-MCNC: 0.5 MG/DL (ref 0–1.2)
BUN SERPL-MCNC: 18 MG/DL (ref 6–20)
BUN/CREAT SERPL: 23.1 (ref 7–25)
CALCIUM SERPL-MCNC: 9.4 MG/DL (ref 8.6–10.5)
CHLORIDE SERPL-SCNC: 98 MMOL/L (ref 98–107)
CO2 SERPL-SCNC: 26.4 MMOL/L (ref 22–29)
CREAT SERPL-MCNC: 0.78 MG/DL (ref 0.76–1.27)
GLOBULIN SER CALC-MCNC: 2.5 GM/DL
GLUCOSE SERPL-MCNC: 190 MG/DL (ref 65–99)
HBA1C MFR BLD: 8.6 % (ref 4.8–5.6)
POTASSIUM SERPL-SCNC: 4 MMOL/L (ref 3.5–5.2)
PROT SERPL-MCNC: 6.9 G/DL (ref 6–8.5)
SODIUM SERPL-SCNC: 132 MMOL/L (ref 136–145)

## 2020-10-20 DIAGNOSIS — E11.9 TYPE 2 DIABETES MELLITUS WITHOUT COMPLICATION, WITHOUT LONG-TERM CURRENT USE OF INSULIN (HCC): ICD-10-CM

## 2020-10-20 RX ORDER — SEMAGLUTIDE 1.34 MG/ML
1 INJECTION, SOLUTION SUBCUTANEOUS WEEKLY
Qty: 4 PEN | Refills: 3 | Status: SHIPPED | OUTPATIENT
Start: 2020-10-20 | End: 2021-06-28

## 2020-10-20 RX ORDER — SEMAGLUTIDE 1.34 MG/ML
0.5 INJECTION, SOLUTION SUBCUTANEOUS WEEKLY
Qty: 4 PEN | Refills: 0 | Status: SHIPPED | OUTPATIENT
Start: 2020-10-20 | End: 2020-10-20

## 2020-10-21 ENCOUNTER — APPOINTMENT (OUTPATIENT)
Dept: WOMENS IMAGING | Facility: HOSPITAL | Age: 55
End: 2020-10-21

## 2020-10-21 PROCEDURE — 71046 X-RAY EXAM CHEST 2 VIEWS: CPT | Performed by: RADIOLOGY

## 2020-10-22 ENCOUNTER — TELEPHONE (OUTPATIENT)
Dept: FAMILY MEDICINE CLINIC | Facility: CLINIC | Age: 55
End: 2020-10-22

## 2020-10-22 NOTE — TELEPHONE ENCOUNTER
Arcadio called in from [facility] and stated has two prescriptions for Semaglutide, 1 MG/DOSE, (Ozempic, 1 MG/DOSE,) 2 MG/1.5ML solution pen-injector  And wants to double check quantity  And the note says to place on file the dosage he needs would actually need to be filled .           Please call arcadio at [phone]

## 2020-12-02 RX ORDER — DAPAGLIFLOZIN 10 MG/1
TABLET, FILM COATED ORAL
Qty: 90 TABLET | Refills: 1 | Status: SHIPPED | OUTPATIENT
Start: 2020-12-02 | End: 2021-06-04

## 2021-01-19 ENCOUNTER — IMMUNIZATION (OUTPATIENT)
Dept: VACCINE CLINIC | Facility: HOSPITAL | Age: 56
End: 2021-01-19

## 2021-01-19 PROCEDURE — 91300 HC SARSCOV02 VAC 30MCG/0.3ML IM: CPT | Performed by: INTERNAL MEDICINE

## 2021-01-19 PROCEDURE — 0001A: CPT | Performed by: INTERNAL MEDICINE

## 2021-02-03 ENCOUNTER — OFFICE VISIT (OUTPATIENT)
Dept: FAMILY MEDICINE CLINIC | Facility: CLINIC | Age: 56
End: 2021-02-03

## 2021-02-03 VITALS
OXYGEN SATURATION: 97 % | RESPIRATION RATE: 16 BRPM | WEIGHT: 264.1 LBS | TEMPERATURE: 96.8 F | DIASTOLIC BLOOD PRESSURE: 88 MMHG | BODY MASS INDEX: 39.12 KG/M2 | SYSTOLIC BLOOD PRESSURE: 136 MMHG | HEART RATE: 92 BPM | HEIGHT: 69 IN

## 2021-02-03 DIAGNOSIS — Z12.5 SCREENING FOR PROSTATE CANCER: ICD-10-CM

## 2021-02-03 DIAGNOSIS — I10 ESSENTIAL HYPERTENSION: ICD-10-CM

## 2021-02-03 DIAGNOSIS — E78.1 HYPERTRIGLYCERIDEMIA: ICD-10-CM

## 2021-02-03 DIAGNOSIS — Z79.899 MEDICATION MANAGEMENT: ICD-10-CM

## 2021-02-03 DIAGNOSIS — E11.9 TYPE 2 DIABETES MELLITUS WITHOUT COMPLICATION, WITHOUT LONG-TERM CURRENT USE OF INSULIN (HCC): Primary | ICD-10-CM

## 2021-02-03 PROCEDURE — 99213 OFFICE O/P EST LOW 20 MIN: CPT | Performed by: NURSE PRACTITIONER

## 2021-02-03 NOTE — PROGRESS NOTES
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   Constitutional:       Appearance: Normal appearance. He is obese.   Cardiovascular:      Rate and Rhythm: Normal rate and regular rhythm.      Heart sounds: No murmur. No friction rub. No gallop.    Pulmonary:      Effort: Pulmonary effort is normal. No respiratory distress.      Breath sounds: Normal breath sounds. No wheezing, rhonchi or rales.   Neurological:      Mental Status: He is alert and oriented to person, place, and time.        Result Review :     Common labs    Common Labsle 5/27/20 5/27/20 5/27/20 10/16/20 10/16/20    1045 1045 1045 1101 1101   Glucose 316 (A)    190 (A)   BUN 15    18   Creatinine 0.91    0.78   eGFR Non  Am 87    104   eGFR African Am 105    126   Sodium 135 (A)    132 (A)   Potassium 4.1    4.0   Chloride 99    98   Calcium 9.6    9.4   Total Protein 7.5    6.9   Albumin 4.80    4.40   Total Bilirubin 0.4    0.5   Alkaline Phosphatase 70    69   AST (SGOT) 30    34   ALT (SGPT) 63 (A)    59 (A)   Hemoglobin A1C  8.90 (A)  8.60 (A)    Microalbumin, Urine   <3.0     (A) Abnormal value       Comments are available for some flowsheets but are not being displayed.                     Assessment and Plan    Problem List Items Addressed This Visit        Cardiac and Vasculature    Essential hypertension "    Relevant Orders    Comprehensive Metabolic Panel       Endocrine and Metabolic    Diabetes mellitus type 2, uncomplicated (CMS/HCC) - Primary    Relevant Orders    Comprehensive Metabolic Panel    Hemoglobin A1c       Health Encounters    Medication management    Relevant Orders    CBC & Differential    Comprehensive Metabolic Panel      Other Visit Diagnoses     Screening for prostate cancer        Relevant Orders    PSA Screen    Hypertriglyceridemia        Relevant Orders    Lipid Panel With LDL / HDL Ratio          Follow Up   No follow-ups on file.  Patient was given instructions and counseling regarding his condition or for health maintenance advice. Please see specific information pulled into the AVS if appropriate.       Weight is down 24 lbs  Tolerating ozempic  Glucose is improved, he is hopeful A1C is improved  Labs today  Energy is improved  His foot and knee is improved, has retired early

## 2021-02-04 DIAGNOSIS — Z79.899 MEDICATION MANAGEMENT: Primary | ICD-10-CM

## 2021-02-04 LAB
ALBUMIN SERPL-MCNC: 4.4 G/DL (ref 3.5–5.2)
ALBUMIN/GLOB SERPL: 1.6 G/DL
ALP SERPL-CCNC: 63 U/L (ref 39–117)
ALT SERPL-CCNC: 89 U/L (ref 1–41)
AST SERPL-CCNC: 54 U/L (ref 1–40)
BASOPHILS # BLD AUTO: 0.03 10*3/MM3 (ref 0–0.2)
BASOPHILS NFR BLD AUTO: 0.7 % (ref 0–1.5)
BILIRUB SERPL-MCNC: 0.8 MG/DL (ref 0–1.2)
BUN SERPL-MCNC: 10 MG/DL (ref 6–20)
BUN/CREAT SERPL: 12.3 (ref 7–25)
CALCIUM SERPL-MCNC: 9.3 MG/DL (ref 8.6–10.5)
CHLORIDE SERPL-SCNC: 101 MMOL/L (ref 98–107)
CHOLEST SERPL-MCNC: 128 MG/DL (ref 0–200)
CO2 SERPL-SCNC: 22.5 MMOL/L (ref 22–29)
CREAT SERPL-MCNC: 0.81 MG/DL (ref 0.76–1.27)
EOSINOPHIL # BLD AUTO: 0.14 10*3/MM3 (ref 0–0.4)
EOSINOPHIL NFR BLD AUTO: 3.1 % (ref 0.3–6.2)
ERYTHROCYTE [DISTWIDTH] IN BLOOD BY AUTOMATED COUNT: 12.4 % (ref 12.3–15.4)
GLOBULIN SER CALC-MCNC: 2.8 GM/DL
GLUCOSE SERPL-MCNC: 159 MG/DL (ref 65–99)
HBA1C MFR BLD: 7.8 % (ref 4.8–5.6)
HCT VFR BLD AUTO: 53.6 % (ref 37.5–51)
HDLC SERPL-MCNC: 39 MG/DL (ref 40–60)
HGB BLD-MCNC: 18.2 G/DL (ref 13–17.7)
IMM GRANULOCYTES # BLD AUTO: 0.01 10*3/MM3 (ref 0–0.05)
IMM GRANULOCYTES NFR BLD AUTO: 0.2 % (ref 0–0.5)
LDLC SERPL CALC-MCNC: 74 MG/DL (ref 0–100)
LDLC/HDLC SERPL: 1.92 {RATIO}
LYMPHOCYTES # BLD AUTO: 1.27 10*3/MM3 (ref 0.7–3.1)
LYMPHOCYTES NFR BLD AUTO: 28 % (ref 19.6–45.3)
MCH RBC QN AUTO: 29.4 PG (ref 26.6–33)
MCHC RBC AUTO-ENTMCNC: 34 G/DL (ref 31.5–35.7)
MCV RBC AUTO: 86.7 FL (ref 79–97)
MONOCYTES # BLD AUTO: 0.54 10*3/MM3 (ref 0.1–0.9)
MONOCYTES NFR BLD AUTO: 11.9 % (ref 5–12)
NEUTROPHILS # BLD AUTO: 2.55 10*3/MM3 (ref 1.7–7)
NEUTROPHILS NFR BLD AUTO: 56.1 % (ref 42.7–76)
NRBC BLD AUTO-RTO: 0 /100 WBC (ref 0–0.2)
PLATELET # BLD AUTO: 226 10*3/MM3 (ref 140–450)
POTASSIUM SERPL-SCNC: 3.9 MMOL/L (ref 3.5–5.2)
PROT SERPL-MCNC: 7.2 G/DL (ref 6–8.5)
PSA SERPL-MCNC: 0.5 NG/ML (ref 0–4)
RBC # BLD AUTO: 6.18 10*6/MM3 (ref 4.14–5.8)
SODIUM SERPL-SCNC: 137 MMOL/L (ref 136–145)
TRIGL SERPL-MCNC: 71 MG/DL (ref 0–150)
VLDLC SERPL CALC-MCNC: 15 MG/DL (ref 5–40)
WBC # BLD AUTO: 4.54 10*3/MM3 (ref 3.4–10.8)

## 2021-02-09 ENCOUNTER — IMMUNIZATION (OUTPATIENT)
Dept: VACCINE CLINIC | Facility: HOSPITAL | Age: 56
End: 2021-02-09

## 2021-02-09 PROCEDURE — 0002A: CPT | Performed by: INTERNAL MEDICINE

## 2021-02-09 PROCEDURE — 91300 HC SARSCOV02 VAC 30MCG/0.3ML IM: CPT | Performed by: INTERNAL MEDICINE

## 2021-03-04 RX ORDER — LOSARTAN POTASSIUM AND HYDROCHLOROTHIAZIDE 12.5; 5 MG/1; MG/1
TABLET ORAL
Qty: 90 TABLET | Refills: 1 | Status: SHIPPED | OUTPATIENT
Start: 2021-03-04 | End: 2021-09-14

## 2021-03-04 RX ORDER — MELOXICAM 7.5 MG/1
TABLET ORAL
Qty: 90 TABLET | Refills: 1 | Status: SHIPPED | OUTPATIENT
Start: 2021-03-04 | End: 2021-09-02

## 2021-03-16 ENCOUNTER — TELEMEDICINE (OUTPATIENT)
Dept: FAMILY MEDICINE CLINIC | Facility: CLINIC | Age: 56
End: 2021-03-16

## 2021-03-16 DIAGNOSIS — N52.9 ERECTILE DYSFUNCTION, UNSPECIFIED ERECTILE DYSFUNCTION TYPE: Primary | ICD-10-CM

## 2021-03-16 PROCEDURE — 99212 OFFICE O/P EST SF 10 MIN: CPT | Performed by: NURSE PRACTITIONER

## 2021-03-16 RX ORDER — SILDENAFIL 25 MG/1
25 TABLET, FILM COATED ORAL DAILY PRN
Qty: 90 TABLET | Refills: 1 | Status: SHIPPED | OUTPATIENT
Start: 2021-03-16 | End: 2022-07-07

## 2021-03-16 NOTE — PROGRESS NOTES
Chief Complaint  Erectile Dysfunction    Subjective          Raymundo Fall presents to [facility]  Patient presents for concerns regarding ED. Has recently started seeing someone and having issues. Interested in medication to treat concerns. No previous medication tried in past. Able to achieve an erection, having trouble maintaining an erection.      Objective   Vital Signs:   There were no vitals taken for this visit.    Physical Exam  Constitutional:       Appearance: Normal appearance.   Skin:     General: Skin is warm and dry.   Neurological:      Mental Status: He is alert.        Result Review :                 Assessment and Plan    Diagnoses and all orders for this visit:    1. Erectile dysfunction, unspecified erectile dysfunction type (Primary)    Other orders  -     sildenafil (Viagra) 25 MG tablet; Take 1 tablet by mouth Daily As Needed for Erectile Dysfunction.  Dispense: 90 tablet; Refill: 1        Follow Up   Return if symptoms worsen or fail to improve.  Patient was given instructions and counseling regarding his condition or for health maintenance advice. Please see specific information pulled into the AVS if appropriate.     You have chosen to receive care through a telehealth visit.  Do you consent to use a video/audio connection for your medical care today? Yes  Time spent 15 minutes    No previous treatment, able to obtain an erection  Will start viagra 25 mg, instructed to take 45 minutes before sexual activity. Dose may need to be adjusted. Ok to increase to up to 100 mg, advised an erection lasting > 4 hours is medical emergency and to proceed to ER.

## 2021-03-30 RX ORDER — AMLODIPINE BESYLATE 10 MG/1
TABLET ORAL
Qty: 90 TABLET | Refills: 1 | Status: SHIPPED | OUTPATIENT
Start: 2021-03-30 | End: 2021-09-28

## 2021-06-04 RX ORDER — DAPAGLIFLOZIN 10 MG/1
TABLET, FILM COATED ORAL
Qty: 90 TABLET | Refills: 1 | Status: SHIPPED | OUTPATIENT
Start: 2021-06-04 | End: 2021-12-06

## 2021-06-28 RX ORDER — SEMAGLUTIDE 1.34 MG/ML
1 INJECTION, SOLUTION SUBCUTANEOUS WEEKLY
Qty: 6 PEN | Refills: 1 | Status: SHIPPED | OUTPATIENT
Start: 2021-06-28 | End: 2021-07-01

## 2021-06-30 ENCOUNTER — TELEPHONE (OUTPATIENT)
Dept: FAMILY MEDICINE CLINIC | Facility: CLINIC | Age: 56
End: 2021-06-30

## 2021-07-13 ENCOUNTER — OFFICE VISIT (OUTPATIENT)
Dept: FAMILY MEDICINE CLINIC | Facility: CLINIC | Age: 56
End: 2021-07-13

## 2021-07-13 VITALS
HEART RATE: 75 BPM | TEMPERATURE: 96.8 F | RESPIRATION RATE: 16 BRPM | DIASTOLIC BLOOD PRESSURE: 82 MMHG | WEIGHT: 270.3 LBS | OXYGEN SATURATION: 99 % | BODY MASS INDEX: 40.04 KG/M2 | SYSTOLIC BLOOD PRESSURE: 114 MMHG | HEIGHT: 69 IN

## 2021-07-13 DIAGNOSIS — E11.9 TYPE 2 DIABETES MELLITUS WITHOUT COMPLICATION, WITHOUT LONG-TERM CURRENT USE OF INSULIN (HCC): Primary | ICD-10-CM

## 2021-07-13 DIAGNOSIS — Z12.11 SCREENING FOR COLON CANCER: ICD-10-CM

## 2021-07-13 DIAGNOSIS — Z79.899 MEDICATION MANAGEMENT: ICD-10-CM

## 2021-07-13 PROCEDURE — 99213 OFFICE O/P EST LOW 20 MIN: CPT | Performed by: NURSE PRACTITIONER

## 2021-07-13 NOTE — PROGRESS NOTES
"Chief Complaint  Diabetes and Hypertension    Subjective          Raymundo Fall presents to [facility]  Diabetes  He presents for his follow-up diabetic visit. He has type 2 diabetes mellitus. His disease course has been stable. Pertinent negatives for hypoglycemia include no headaches or sweats. There are no diabetic associated symptoms. Pertinent negatives for diabetes include no blurred vision and no chest pain. There are no diabetic complications. Risk factors for coronary artery disease include diabetes mellitus, male sex, hypertension and obesity. Current diabetic treatment includes oral agent (dual therapy). He is compliant with treatment most of the time. An ACE inhibitor/angiotensin II receptor blocker is being taken. Eye exam is current.   Hypertension  This is a chronic problem. The current episode started more than 1 year ago. The problem is unchanged. The problem is controlled. Pertinent negatives include no anxiety, blurred vision, chest pain, headaches, malaise/fatigue, neck pain, orthopnea, palpitations, peripheral edema, PND, shortness of breath or sweats. There are no associated agents to hypertension. Risk factors for coronary artery disease include diabetes mellitus, obesity and male gender. Past treatments include angiotensin blockers and diuretics. Current antihypertension treatment includes angiotensin blockers, diuretics and calcium channel blockers. The current treatment provides significant improvement. There are no compliance problems.        Objective   Vital Signs:   /82   Pulse 75   Temp 96.8 °F (36 °C) (Temporal)   Resp 16   Ht 175.3 cm (69\")   Wt 123 kg (270 lb 4.8 oz)   SpO2 99%   BMI 39.92 kg/m²     Physical Exam  Vitals reviewed.   Constitutional:       General: He is not in acute distress.     Appearance: He is well-developed. He is not diaphoretic.   Cardiovascular:      Rate and Rhythm: Normal rate and regular rhythm.      Heart " sounds: Normal heart sounds. No murmur heard.   No friction rub. No gallop.    Pulmonary:      Effort: Pulmonary effort is normal. No respiratory distress.      Breath sounds: Normal breath sounds. No wheezing or rales.   Abdominal:      General: Bowel sounds are normal. There is no distension.      Palpations: Abdomen is soft.      Tenderness: There is no abdominal tenderness.   Musculoskeletal:      Cervical back: Neck supple.   Skin:     General: Skin is warm and dry.   Neurological:      Mental Status: He is alert and oriented to person, place, and time.        Result Review :                 Assessment and Plan    Diagnoses and all orders for this visit:    1. Type 2 diabetes mellitus without complication, without long-term current use of insulin (CMS/[facility]) (Primary)  -     Hemoglobin A1c  -     Comprehensive metabolic panel  -     Microalbumin / Creatinine Urine Ratio - Urine, Clean Catch    2. Medication management  -     CBC & Differential    3. Screening for colon cancer  -     Cologuard - Stool, Per Rectum; Future        Follow Up   No follow-ups on file.  Patient was given instructions and counseling regarding his condition or for health maintenance advice. Please see specific information pulled into the AVS if appropriate.     Weight is up 6 lbs, still taking ozempic, tolerating well  Denies issues with blood sugar, but not checking frequently  Will obtain labs today, follow up in 4-6 months, sooner as needed  cologuard completed in 2018, will reorder as this is due.

## 2021-07-14 LAB
ALBUMIN SERPL-MCNC: 4.7 G/DL (ref 3.5–5.2)
ALBUMIN/CREAT UR: <7 MG/G CREAT (ref 0–29)
ALBUMIN/GLOB SERPL: 2 G/DL
ALP SERPL-CCNC: 64 U/L (ref 39–117)
ALT SERPL-CCNC: 49 U/L (ref 1–41)
AST SERPL-CCNC: 23 U/L (ref 1–40)
BASOPHILS # BLD AUTO: 0.04 10*3/MM3 (ref 0–0.2)
BASOPHILS NFR BLD AUTO: 0.7 % (ref 0–1.5)
BILIRUB SERPL-MCNC: 0.8 MG/DL (ref 0–1.2)
BUN SERPL-MCNC: 14 MG/DL (ref 6–20)
BUN/CREAT SERPL: 18.9 (ref 7–25)
CALCIUM SERPL-MCNC: 9.7 MG/DL (ref 8.6–10.5)
CHLORIDE SERPL-SCNC: 98 MMOL/L (ref 98–107)
CO2 SERPL-SCNC: 22.6 MMOL/L (ref 22–29)
CREAT SERPL-MCNC: 0.74 MG/DL (ref 0.76–1.27)
CREAT UR-MCNC: 45.8 MG/DL
EOSINOPHIL # BLD AUTO: 0.25 10*3/MM3 (ref 0–0.4)
EOSINOPHIL NFR BLD AUTO: 4.5 % (ref 0.3–6.2)
ERYTHROCYTE [DISTWIDTH] IN BLOOD BY AUTOMATED COUNT: 12.5 % (ref 12.3–15.4)
GLOBULIN SER CALC-MCNC: 2.4 GM/DL
GLUCOSE SERPL-MCNC: 167 MG/DL (ref 65–99)
HBA1C MFR BLD: 7.6 % (ref 4.8–5.6)
HCT VFR BLD AUTO: 50.6 % (ref 37.5–51)
HGB BLD-MCNC: 17.6 G/DL (ref 13–17.7)
IMM GRANULOCYTES # BLD AUTO: 0.01 10*3/MM3 (ref 0–0.05)
IMM GRANULOCYTES NFR BLD AUTO: 0.2 % (ref 0–0.5)
LYMPHOCYTES # BLD AUTO: 1.74 10*3/MM3 (ref 0.7–3.1)
LYMPHOCYTES NFR BLD AUTO: 31.2 % (ref 19.6–45.3)
MCH RBC QN AUTO: 30.1 PG (ref 26.6–33)
MCHC RBC AUTO-ENTMCNC: 34.8 G/DL (ref 31.5–35.7)
MCV RBC AUTO: 86.5 FL (ref 79–97)
MICROALBUMIN UR-MCNC: <3 UG/ML
MONOCYTES # BLD AUTO: 0.72 10*3/MM3 (ref 0.1–0.9)
MONOCYTES NFR BLD AUTO: 12.9 % (ref 5–12)
NEUTROPHILS # BLD AUTO: 2.82 10*3/MM3 (ref 1.7–7)
NEUTROPHILS NFR BLD AUTO: 50.5 % (ref 42.7–76)
NRBC BLD AUTO-RTO: 0 /100 WBC (ref 0–0.2)
PLATELET # BLD AUTO: 161 10*3/MM3 (ref 140–450)
POTASSIUM SERPL-SCNC: 3.8 MMOL/L (ref 3.5–5.2)
PROT SERPL-MCNC: 7.1 G/DL (ref 6–8.5)
RBC # BLD AUTO: 5.85 10*6/MM3 (ref 4.14–5.8)
SODIUM SERPL-SCNC: 136 MMOL/L (ref 136–145)
WBC # BLD AUTO: 5.58 10*3/MM3 (ref 3.4–10.8)

## 2021-09-02 RX ORDER — MELOXICAM 7.5 MG/1
TABLET ORAL
Qty: 30 TABLET | Refills: 5 | Status: SHIPPED | OUTPATIENT
Start: 2021-09-02 | End: 2022-03-15

## 2021-09-02 NOTE — TELEPHONE ENCOUNTER
Rx Refill Note  Requested Prescriptions     Pending Prescriptions Disp Refills   • meloxicam (MOBIC) 7.5 MG tablet [Pharmacy Med Name: MELOXICAM 7.5 MG TABLET] 30 tablet      Sig: TAKE ONE TABLET BY MOUTH DAILY      Last office visit with prescribing clinician: 7/13/2021      Next office visit with prescribing clinician: 11/16/2021            Yosvany Paul MA  09/02/21, 15:12 EDT

## 2021-09-13 NOTE — TELEPHONE ENCOUNTER
Rx Refill Note  Requested Prescriptions     Pending Prescriptions Disp Refills   • losartan-hydrochlorothiazide (HYZAAR) 50-12.5 MG per tablet [Pharmacy Med Name: LOSARTAN-HCTZ 50-12.5 MG TAB] 90 tablet 1     Sig: TAKE ONE TABLET BY MOUTH DAILY      Last office visit with prescribing clinician: 7/13/2021      Next office visit with prescribing clinician: 11/16/2021       {TIP  Please add Last Relevant Lab Date if appropriate: 7/13/21    Talita Spangler MA  09/13/21, 13:12 EDT

## 2021-09-14 RX ORDER — LOSARTAN POTASSIUM AND HYDROCHLOROTHIAZIDE 12.5; 5 MG/1; MG/1
TABLET ORAL
Qty: 90 TABLET | Refills: 1 | Status: SHIPPED | OUTPATIENT
Start: 2021-09-14 | End: 2021-12-13

## 2021-09-28 RX ORDER — AMLODIPINE BESYLATE 10 MG/1
TABLET ORAL
Qty: 90 TABLET | Refills: 1 | Status: SHIPPED | OUTPATIENT
Start: 2021-09-28 | End: 2022-03-22

## 2021-09-28 NOTE — TELEPHONE ENCOUNTER
Rx Refill Note  Requested Prescriptions     Pending Prescriptions Disp Refills   • metFORMIN (GLUCOPHAGE) 500 MG tablet [Pharmacy Med Name: metFORMIN  MG TABLET] 360 tablet 1     Sig: TAKE TWO TABLETS BY MOUTH TWICE A DAY   • amLODIPine (NORVASC) 10 MG tablet [Pharmacy Med Name: amLODIPine BESYLATE 10 MG TAB] 90 tablet 1     Sig: TAKE ONE TABLET BY MOUTH DAILY      Last office visit with prescribing clinician: 7/13/2021      Next office visit with prescribing clinician: 11/16/2021            Yosvany Paul MA  09/28/21, 16:58 EDT

## 2021-11-16 ENCOUNTER — OFFICE VISIT (OUTPATIENT)
Dept: FAMILY MEDICINE CLINIC | Facility: CLINIC | Age: 56
End: 2021-11-16

## 2021-11-16 VITALS
DIASTOLIC BLOOD PRESSURE: 80 MMHG | RESPIRATION RATE: 16 BRPM | SYSTOLIC BLOOD PRESSURE: 116 MMHG | HEIGHT: 69 IN | TEMPERATURE: 95.3 F | HEART RATE: 73 BPM | WEIGHT: 273.9 LBS | OXYGEN SATURATION: 97 % | BODY MASS INDEX: 40.57 KG/M2

## 2021-11-16 DIAGNOSIS — E11.9 TYPE 2 DIABETES MELLITUS WITHOUT COMPLICATION, WITHOUT LONG-TERM CURRENT USE OF INSULIN (HCC): Primary | ICD-10-CM

## 2021-11-16 PROCEDURE — 90471 IMMUNIZATION ADMIN: CPT | Performed by: NURSE PRACTITIONER

## 2021-11-16 PROCEDURE — 99213 OFFICE O/P EST LOW 20 MIN: CPT | Performed by: NURSE PRACTITIONER

## 2021-11-16 PROCEDURE — 90686 IIV4 VACC NO PRSV 0.5 ML IM: CPT | Performed by: NURSE PRACTITIONER

## 2021-11-16 NOTE — PROGRESS NOTES
"Chief Complaint  Diabetes (6mo FU)    Subjective          Raymundo Fall presents to [facility]  Diabetes follow up. He reports to be doing well. No acute concerns.He is tolerating his medication well. Weight has remained stable. He is walking frequently.     Diabetes  He presents for his follow-up diabetic visit. He has type 2 diabetes mellitus. His disease course has been stable. There are no hypoglycemic associated symptoms. There are no diabetic associated symptoms. There are no hypoglycemic complications. Symptoms are stable. There are no diabetic complications. He is following a generally healthy diet. An ACE inhibitor/angiotensin II receptor blocker is being taken. He does not see a podiatrist.Eye exam is not current.       Objective   Vital Signs:   /80   Pulse 73   Temp 95.3 °F (35.2 °C) (Infrared)   Resp 16   Ht 175.3 cm (69\")   Wt 124 kg (273 lb 14.4 oz)   SpO2 97%   BMI 40.45 kg/m²     Physical Exam  Constitutional:       General: He is not in acute distress.     Appearance: He is well-developed. He is not diaphoretic.   Cardiovascular:      Rate and Rhythm: Normal rate and regular rhythm.      Heart sounds: Normal heart sounds. No murmur heard.  No friction rub. No gallop.    Pulmonary:      Effort: Pulmonary effort is normal. No respiratory distress.      Breath sounds: Normal breath sounds. No wheezing or rales.   Abdominal:      General: Bowel sounds are normal. There is no distension.      Palpations: Abdomen is soft.      Tenderness: There is no abdominal tenderness.   Musculoskeletal:      Cervical back: Neck supple.   Skin:     General: Skin is warm and dry.   Neurological:      Mental Status: He is alert and oriented to person, place, and time.        Result Review :                 Assessment and Plan    Diagnoses and all orders for this visit:    1. Type 2 diabetes mellitus without complication, without long-term current use of insulin (HCC) " (Primary)  -     Comprehensive metabolic panel  -     Hemoglobin A1c    Other orders  -     FluLaval/Fluarix/Fluzone >6 Months ([id number])        Follow Up   Return in about 4 months (around 3/16/2022) for Annual physical.  Patient was given instructions and counseling regarding his condition or for health maintenance advice. Please see specific information pulled into the AVS if appropriate.     Diabetes has been improved on ozempic, weight has stabilized  He does report increased physical activity, he is walking about 5 days a week  Will repeat a1c and cmp today to monitor progress  Flu shot today  Follow up in four months for physical exam.

## 2021-11-17 LAB
ALBUMIN SERPL-MCNC: 4.7 G/DL (ref 3.8–4.9)
ALBUMIN/GLOB SERPL: 2 {RATIO} (ref 1.2–2.2)
ALP SERPL-CCNC: 67 IU/L (ref 44–121)
ALT SERPL-CCNC: 72 IU/L (ref 0–44)
AST SERPL-CCNC: 35 IU/L (ref 0–40)
BILIRUB SERPL-MCNC: 0.8 MG/DL (ref 0–1.2)
BUN SERPL-MCNC: 16 MG/DL (ref 6–24)
BUN/CREAT SERPL: 19 (ref 9–20)
CALCIUM SERPL-MCNC: 9.9 MG/DL (ref 8.7–10.2)
CHLORIDE SERPL-SCNC: 102 MMOL/L (ref 96–106)
CO2 SERPL-SCNC: 21 MMOL/L (ref 20–29)
CREAT SERPL-MCNC: 0.85 MG/DL (ref 0.76–1.27)
GLOBULIN SER CALC-MCNC: 2.4 G/DL (ref 1.5–4.5)
GLUCOSE SERPL-MCNC: 159 MG/DL (ref 65–99)
HBA1C MFR BLD: 7.6 % (ref 4.8–5.6)
POTASSIUM SERPL-SCNC: 4.3 MMOL/L (ref 3.5–5.2)
PROT SERPL-MCNC: 7.1 G/DL (ref 6–8.5)
SODIUM SERPL-SCNC: 139 MMOL/L (ref 134–144)

## 2021-12-06 RX ORDER — DAPAGLIFLOZIN 10 MG/1
TABLET, FILM COATED ORAL
Qty: 90 TABLET | Refills: 1 | Status: SHIPPED | OUTPATIENT
Start: 2021-12-06 | End: 2022-06-21

## 2021-12-06 NOTE — TELEPHONE ENCOUNTER
Rx Refill Note  Requested Prescriptions     Pending Prescriptions Disp Refills   • Farxiga 10 MG tablet [Pharmacy Med Name: FARXIGA 10 MG TABLET] 90 tablet 1     Sig: TAKE ONE TABLET BY MOUTH DAILY      Last office visit with prescribing clinician: 11/16/2021      Next office visit with prescribing clinician: 3/16/2022            Yosvany Paul MA  12/06/21, 15:55 EST

## 2021-12-13 RX ORDER — HYDROCHLOROTHIAZIDE 12.5 MG/1
12.5 TABLET ORAL DAILY
Qty: 90 TABLET | Refills: 1 | Status: SHIPPED | OUTPATIENT
Start: 2021-12-13 | End: 2022-06-07

## 2021-12-13 RX ORDER — LOSARTAN POTASSIUM 50 MG/1
50 TABLET ORAL DAILY
Qty: 90 TABLET | Refills: 1 | Status: SHIPPED | OUTPATIENT
Start: 2021-12-13 | End: 2022-06-07

## 2021-12-22 RX ORDER — SEMAGLUTIDE 1.34 MG/ML
INJECTION, SOLUTION SUBCUTANEOUS
Qty: 9 ML | Refills: 1 | Status: SHIPPED | OUTPATIENT
Start: 2021-12-22 | End: 2022-06-07

## 2022-01-21 DIAGNOSIS — E11.9 TYPE 2 DIABETES MELLITUS WITHOUT COMPLICATION, WITHOUT LONG-TERM CURRENT USE OF INSULIN: ICD-10-CM

## 2022-01-21 RX ORDER — BLOOD SUGAR DIAGNOSTIC
STRIP MISCELLANEOUS
Qty: 100 EACH | Refills: 12 | Status: SHIPPED | OUTPATIENT
Start: 2022-01-21 | End: 2022-02-02 | Stop reason: SDUPTHER

## 2022-02-02 DIAGNOSIS — E11.9 TYPE 2 DIABETES MELLITUS WITHOUT COMPLICATION, WITHOUT LONG-TERM CURRENT USE OF INSULIN: Primary | ICD-10-CM

## 2022-02-02 RX ORDER — BLOOD-GLUCOSE METER
KIT MISCELLANEOUS
Qty: 100 EACH | Refills: 12 | Status: SHIPPED | OUTPATIENT
Start: 2022-02-02

## 2022-03-14 NOTE — TELEPHONE ENCOUNTER
Rx Refill Note  Requested Prescriptions     Pending Prescriptions Disp Refills   • meloxicam (MOBIC) 7.5 MG tablet [Pharmacy Med Name: MELOXICAM 7.5 MG TABLET] 30 tablet 5     Sig: TAKE ONE TABLET BY MOUTH DAILY      Last office visit with prescribing clinician: 11/16/2021      Next office visit with prescribing clinician: 3/16/2022       {TIP  Please add Last Relevant Lab Date if appropriate: 11/16/2021    Sabina Saravia MA  03/14/22, 08:50 EDT

## 2022-03-15 RX ORDER — MELOXICAM 7.5 MG/1
TABLET ORAL
Qty: 30 TABLET | Refills: 5 | Status: SHIPPED | OUTPATIENT
Start: 2022-03-15 | End: 2022-10-04

## 2022-03-16 ENCOUNTER — OFFICE VISIT (OUTPATIENT)
Dept: FAMILY MEDICINE CLINIC | Facility: CLINIC | Age: 57
End: 2022-03-16

## 2022-03-16 VITALS
HEART RATE: 75 BPM | SYSTOLIC BLOOD PRESSURE: 118 MMHG | BODY MASS INDEX: 39.29 KG/M2 | RESPIRATION RATE: 18 BRPM | WEIGHT: 265.3 LBS | DIASTOLIC BLOOD PRESSURE: 70 MMHG | HEIGHT: 69 IN | TEMPERATURE: 97.1 F | OXYGEN SATURATION: 98 %

## 2022-03-16 DIAGNOSIS — I10 ESSENTIAL HYPERTENSION: ICD-10-CM

## 2022-03-16 DIAGNOSIS — Z00.00 ANNUAL PHYSICAL EXAM: Primary | ICD-10-CM

## 2022-03-16 DIAGNOSIS — E11.9 TYPE 2 DIABETES MELLITUS WITHOUT COMPLICATION, WITHOUT LONG-TERM CURRENT USE OF INSULIN: ICD-10-CM

## 2022-03-16 DIAGNOSIS — E78.1 HYPERTRIGLYCERIDEMIA: ICD-10-CM

## 2022-03-16 DIAGNOSIS — Z12.5 SCREENING FOR PROSTATE CANCER: ICD-10-CM

## 2022-03-16 DIAGNOSIS — Z13.29 SCREENING FOR THYROID DISORDER: ICD-10-CM

## 2022-03-16 PROCEDURE — 99396 PREV VISIT EST AGE 40-64: CPT | Performed by: NURSE PRACTITIONER

## 2022-03-16 NOTE — PROGRESS NOTES
"Chief Complaint  Annual Exam    Subjective          Raymundo Fall presents to [facility]  Raymundo presents for an annual physical. No acute concerns today.     Reports chronic arthritis, states better since retiring. He reports increased physical activity, he is in a new relationship and walks frequently and eats a well balanced diet. He does report his father had a recent stroke and is still hospitalized so his eating/exercise has been down in the past week or so.      Objective   Vital Signs:   /70 (BP Location: Left arm, Patient Position: Sitting, Cuff Size: Adult)   Pulse 75   Temp 97.1 °F (36.2 °C) (Temporal)   Resp 18   Ht 175.3 cm (69.02\")   Wt 120 kg (265 lb 4.8 oz)   SpO2 98%   BMI 39.16 kg/m²     Physical Exam  Vitals reviewed.   Constitutional:       General: He is not in acute distress.     Appearance: He is well-developed. He is not diaphoretic.   HENT:      Head: Normocephalic and atraumatic.      Right Ear: Tympanic membrane, ear canal and external ear normal.      Left Ear: Tympanic membrane, ear canal and external ear normal.      Nose: Nose normal.      Mouth/Throat:      Mouth: Mucous membranes are moist.      Pharynx: Oropharynx is clear. Uvula midline. No oropharyngeal exudate.   Eyes:      Conjunctiva/sclera: Conjunctivae normal.      Pupils: Pupils are equal, round, and reactive to light.   Cardiovascular:      Rate and Rhythm: Normal rate and regular rhythm.      Heart sounds: Normal heart sounds. No murmur heard.    No friction rub. No gallop.   Pulmonary:      Effort: Pulmonary effort is normal. No respiratory distress.      Breath sounds: Normal breath sounds. No wheezing or rales.   Abdominal:      General: Bowel sounds are normal. There is no distension.      Palpations: Abdomen is soft.      Tenderness: There is no abdominal tenderness.   Musculoskeletal:      Cervical back: Neck supple.   Lymphadenopathy:      Cervical: No cervical " adenopathy.   Skin:     General: Skin is warm and dry.   Neurological:      Mental Status: He is alert and oriented to person, place, and time.   Psychiatric:         Mood and Affect: Mood normal.        Result Review :                 Assessment and Plan    Diagnoses and all orders for this visit:    1. Annual physical exam (Primary)  -     CBC & Differential  -     Comprehensive Metabolic Panel  -     Lipid Panel With LDL / HDL Ratio    2. Type 2 diabetes mellitus without complication, without long-term current use of insulin (HCC)  -     Hemoglobin A1c  -     CBC & Differential  -     Comprehensive Metabolic Panel    3. Essential hypertension  -     CBC & Differential  -     Comprehensive Metabolic Panel    4. Screening for prostate cancer  -     PSA Screen    5. Hypertriglyceridemia  -     Lipid Panel With LDL / HDL Ratio    6. Screening for thyroid disorder  -     TSH Rfx On Abnormal To Free T4        Follow Up   No follow-ups on file.  Patient was given instructions and counseling regarding his condition or for health maintenance advice. Please see specific information pulled into the AVS if appropriate.     \  Information/counseling provided to the patient regarding periodic christian maintenance recommendations, including but not limited to immunizations, diet/exercise/healthy lifestyle, laboratory, and other screenings. BMI is discussed. Appropriate exercise, diet, and weight plans are discussed.    Weight is down, BMI 39.2, weight 265, doing well on ozempic  He is eating well balanced diet and exercising  Recommend to continue walking, he will follow up in six months  Will obtain panel of labs to ensure diabetes is controlled and labs in normal range  Discussed vaccines, he is due for shingrix, he will consider this

## 2022-03-17 LAB
ALBUMIN SERPL-MCNC: 4.5 G/DL (ref 3.8–4.9)
ALBUMIN/GLOB SERPL: 2 {RATIO} (ref 1.2–2.2)
ALP SERPL-CCNC: 63 IU/L (ref 44–121)
ALT SERPL-CCNC: 43 IU/L (ref 0–44)
AST SERPL-CCNC: 33 IU/L (ref 0–40)
BASOPHILS # BLD AUTO: 0 X10E3/UL (ref 0–0.2)
BASOPHILS NFR BLD AUTO: 1 %
BILIRUB SERPL-MCNC: 0.6 MG/DL (ref 0–1.2)
BUN SERPL-MCNC: 14 MG/DL (ref 6–24)
BUN/CREAT SERPL: 17 (ref 9–20)
CALCIUM SERPL-MCNC: 9.2 MG/DL (ref 8.7–10.2)
CHLORIDE SERPL-SCNC: 102 MMOL/L (ref 96–106)
CHOLEST SERPL-MCNC: 153 MG/DL (ref 100–199)
CO2 SERPL-SCNC: 18 MMOL/L (ref 20–29)
CREAT SERPL-MCNC: 0.84 MG/DL (ref 0.76–1.27)
EGFRCR SERPLBLD CKD-EPI 2021: 103 ML/MIN/1.73
EOSINOPHIL # BLD AUTO: 0.2 X10E3/UL (ref 0–0.4)
EOSINOPHIL NFR BLD AUTO: 4 %
ERYTHROCYTE [DISTWIDTH] IN BLOOD BY AUTOMATED COUNT: 12.1 % (ref 11.6–15.4)
GLOBULIN SER CALC-MCNC: 2.3 G/DL (ref 1.5–4.5)
GLUCOSE SERPL-MCNC: 150 MG/DL (ref 65–99)
HBA1C MFR BLD: 6.9 % (ref 4.8–5.6)
HCT VFR BLD AUTO: 50.9 % (ref 37.5–51)
HDLC SERPL-MCNC: 44 MG/DL
HGB BLD-MCNC: 17.2 G/DL (ref 13–17.7)
IMM GRANULOCYTES # BLD AUTO: 0 X10E3/UL (ref 0–0.1)
IMM GRANULOCYTES NFR BLD AUTO: 0 %
LDLC SERPL CALC-MCNC: 89 MG/DL (ref 0–99)
LDLC/HDLC SERPL: 2 RATIO (ref 0–3.6)
LYMPHOCYTES # BLD AUTO: 1.6 X10E3/UL (ref 0.7–3.1)
LYMPHOCYTES NFR BLD AUTO: 32 %
MCH RBC QN AUTO: 29.9 PG (ref 26.6–33)
MCHC RBC AUTO-ENTMCNC: 33.8 G/DL (ref 31.5–35.7)
MCV RBC AUTO: 89 FL (ref 79–97)
MONOCYTES # BLD AUTO: 0.5 X10E3/UL (ref 0.1–0.9)
MONOCYTES NFR BLD AUTO: 10 %
NEUTROPHILS # BLD AUTO: 2.6 X10E3/UL (ref 1.4–7)
NEUTROPHILS NFR BLD AUTO: 53 %
PLATELET # BLD AUTO: 155 X10E3/UL (ref 150–450)
POTASSIUM SERPL-SCNC: 4.1 MMOL/L (ref 3.5–5.2)
PROT SERPL-MCNC: 6.8 G/DL (ref 6–8.5)
PSA SERPL-MCNC: 0.3 NG/ML (ref 0–4)
RBC # BLD AUTO: 5.75 X10E6/UL (ref 4.14–5.8)
SODIUM SERPL-SCNC: 138 MMOL/L (ref 134–144)
TRIGL SERPL-MCNC: 112 MG/DL (ref 0–149)
TSH SERPL DL<=0.005 MIU/L-ACNC: 1.86 UIU/ML (ref 0.45–4.5)
VLDLC SERPL CALC-MCNC: 20 MG/DL (ref 5–40)
WBC # BLD AUTO: 4.9 X10E3/UL (ref 3.4–10.8)

## 2022-03-22 RX ORDER — AMLODIPINE BESYLATE 10 MG/1
TABLET ORAL
Qty: 90 TABLET | Refills: 1 | Status: SHIPPED | OUTPATIENT
Start: 2022-03-22 | End: 2022-09-16 | Stop reason: SDUPTHER

## 2022-03-22 NOTE — TELEPHONE ENCOUNTER
Rx Refill Note  Requested Prescriptions     Pending Prescriptions Disp Refills   • amLODIPine (NORVASC) 10 MG tablet [Pharmacy Med Name: amLODIPine BESYLATE 10 MG TAB] 90 tablet 1     Sig: TAKE ONE TABLET BY MOUTH DAILY      Last office visit with prescribing clinician: 3/16/2022      Next office visit with prescribing clinician: 9/16/2022       {TIP  Please add Last Relevant Lab Date if appropriate: 03/15/22    Sabina Saravia MA  03/22/22, 08:50 EDT

## 2022-04-06 NOTE — TELEPHONE ENCOUNTER
Rx Refill Note  Requested Prescriptions     Pending Prescriptions Disp Refills   • metFORMIN (GLUCOPHAGE) 500 MG tablet [Pharmacy Med Name: metFORMIN  MG TABLET] 360 tablet 1     Sig: TAKE TWO TABLETS BY MOUTH TWICE A DAY      Last office visit with prescribing clinician: 3/16/2022      Next office visit with prescribing clinician: 9/16/2022       {TIP  Please add Last Relevant Lab Date if appropriate: 03/16/22    Sabina Saravia MA  04/06/22, 08:39 EDT

## 2022-06-07 RX ORDER — SEMAGLUTIDE 1.34 MG/ML
INJECTION, SOLUTION SUBCUTANEOUS
Qty: 9 ML | Refills: 1 | Status: SHIPPED | OUTPATIENT
Start: 2022-06-07 | End: 2022-12-05

## 2022-06-07 RX ORDER — LOSARTAN POTASSIUM 50 MG/1
TABLET ORAL
Qty: 90 TABLET | Refills: 1 | Status: SHIPPED | OUTPATIENT
Start: 2022-06-07 | End: 2022-12-05

## 2022-06-07 RX ORDER — HYDROCHLOROTHIAZIDE 12.5 MG/1
TABLET ORAL
Qty: 90 TABLET | Refills: 1 | Status: SHIPPED | OUTPATIENT
Start: 2022-06-07 | End: 2022-12-05

## 2022-06-07 NOTE — TELEPHONE ENCOUNTER
Rx Refill Note  Requested Prescriptions     Pending Prescriptions Disp Refills   • losartan (COZAAR) 50 MG tablet [Pharmacy Med Name: LOSARTAN POTASSIUM 50 MG TAB] 90 tablet 1     Sig: TAKE ONE TABLET BY MOUTH DAILY   • hydroCHLOROthiazide (HYDRODIURIL) 12.5 MG tablet [Pharmacy Med Name: hydroCHLOROthiazide 12.5 MG TABLET] 90 tablet 1     Sig: TAKE ONE TABLET BY MOUTH DAILY   • Ozempic, 1 MG/DOSE, 4 MG/3ML solution pen-injector [Pharmacy Med Name: OZEMPIC 1 MG/DOSE (4 MG/3 ML)] 9 mL 1     Sig: DIAL AND INJECT UNDER THE SKIN 1 MG WEEKLY      Last office visit with prescribing clinician: 3/16/2022      Next office visit with prescribing clinician: 9/16/2022       {TIP  Please add Last Relevant Lab Date if appropriate: 03/16/22    Sabina Saravia MA  06/07/22, 08:26 EDT

## 2022-06-21 RX ORDER — DAPAGLIFLOZIN 10 MG/1
TABLET, FILM COATED ORAL
Qty: 90 TABLET | Refills: 1 | Status: SHIPPED | OUTPATIENT
Start: 2022-06-21 | End: 2022-12-22

## 2022-06-21 NOTE — TELEPHONE ENCOUNTER
Rx Refill Note  Requested Prescriptions     Pending Prescriptions Disp Refills   • Farxiga 10 MG tablet [Pharmacy Med Name: FARXIGA 10 MG TABLET] 90 tablet 1     Sig: TAKE ONE TABLET BY MOUTH DAILY      Last office visit with prescribing clinician: 3/16/2022      Next office visit with prescribing clinician: 9/16/2022       {TIP  Please add Last Relevant Lab Date if appropriate: 03/16/22    Sabina Saravia MA  06/21/22, 07:26 EDT

## 2022-07-07 RX ORDER — SILDENAFIL 25 MG/1
TABLET, FILM COATED ORAL
Qty: 90 TABLET | Refills: 1 | Status: SHIPPED | OUTPATIENT
Start: 2022-07-07 | End: 2022-12-22

## 2022-07-07 NOTE — TELEPHONE ENCOUNTER
Rx Refill Note  Requested Prescriptions     Pending Prescriptions Disp Refills   • sildenafil (VIAGRA) 25 MG tablet [Pharmacy Med Name: SILDENAFIL 25 MG TABLET] 90 tablet 1     Sig: TAKE ONE TABLET BY MOUTH DAILY AS NEEDED FOR ERECTILE DYSFUNCTION      Last office visit with prescribing clinician: 3/16/2022      Next office visit with prescribing clinician: 9/16/2022       {TIP  Please add Last Relevant Lab Date if appropriate: 03/16/22    Sabina Saravia MA  07/07/22, 14:47 EDT

## 2022-09-16 ENCOUNTER — OFFICE VISIT (OUTPATIENT)
Dept: FAMILY MEDICINE CLINIC | Facility: CLINIC | Age: 57
End: 2022-09-16

## 2022-09-16 VITALS
WEIGHT: 257 LBS | SYSTOLIC BLOOD PRESSURE: 116 MMHG | OXYGEN SATURATION: 97 % | BODY MASS INDEX: 38.06 KG/M2 | HEIGHT: 69 IN | TEMPERATURE: 96.9 F | HEART RATE: 81 BPM | DIASTOLIC BLOOD PRESSURE: 78 MMHG | RESPIRATION RATE: 17 BRPM

## 2022-09-16 DIAGNOSIS — E11.9 TYPE 2 DIABETES MELLITUS WITHOUT COMPLICATION, WITHOUT LONG-TERM CURRENT USE OF INSULIN: Primary | ICD-10-CM

## 2022-09-16 PROCEDURE — 99214 OFFICE O/P EST MOD 30 MIN: CPT | Performed by: NURSE PRACTITIONER

## 2022-09-16 RX ORDER — AMLODIPINE BESYLATE 10 MG/1
10 TABLET ORAL DAILY
Qty: 90 TABLET | Refills: 1 | Status: SHIPPED | OUTPATIENT
Start: 2022-09-16 | End: 2023-03-22

## 2022-09-16 NOTE — PROGRESS NOTES
"Chief Complaint  Follow-up (Pt here for BP f/u)    Subjective        Raymundo Fall presents to [facility]  History of Present Illness  Raymundo presents for follow up for diabetes, hypertension. He reports getting  over the summer. He is selling his home and moving to [facility].     Weight loss, down to 257, walking frequently.  Diabetes  He presents for his follow-up diabetic visit. He has type 2 diabetes mellitus. His disease course has been improving. There are no hypoglycemic associated symptoms. Pertinent negatives for hypoglycemia include no headaches or sweats. There are no diabetic associated symptoms. Pertinent negatives for diabetes include no blurred vision and no chest pain. There are no hypoglycemic complications. Symptoms are stable. There are no diabetic complications. Risk factors for coronary artery disease include diabetes mellitus, dyslipidemia, male sex and hypertension. Current diabetic treatment includes oral agent (triple therapy). He is compliant with treatment most of the time. He is following a generally healthy diet. He participates in exercise daily. An ACE inhibitor/angiotensin II receptor blocker is being taken.   Hypertension  This is a chronic problem. The current episode started more than 1 year ago. The problem is unchanged. The problem is controlled. Pertinent negatives include no anxiety, blurred vision, chest pain, headaches, malaise/fatigue, neck pain, orthopnea, palpitations, peripheral edema, PND, shortness of breath or sweats. There are no associated agents to hypertension. Current antihypertension treatment includes angiotensin blockers, diuretics and calcium channel blockers. There are no compliance problems.        Objective   Vital Signs:  /78   Pulse 81   Temp 96.9 °F (36.1 °C)   Resp 17   Ht 175.3 cm (69\")   Wt 117 kg (257 lb)   SpO2 97%   BMI 37.95 kg/m²   Estimated body mass index is 37.95 kg/m² as " "calculated from the following:    Height as of this encounter: 175.3 cm (69\").    Weight as of this encounter: 117 kg (257 lb).          Physical Exam  Vitals reviewed.   Constitutional:       General: He is not in acute distress.     Appearance: Normal appearance. He is well-developed. He is not diaphoretic.   Cardiovascular:      Rate and Rhythm: Normal rate and regular rhythm.      Heart sounds: Normal heart sounds. No murmur heard.    No friction rub. No gallop.   Pulmonary:      Effort: Pulmonary effort is normal. No respiratory distress.      Breath sounds: Normal breath sounds. No wheezing or rales.   Abdominal:      General: Bowel sounds are normal. There is no distension.      Palpations: Abdomen is soft.      Tenderness: There is no abdominal tenderness.   Musculoskeletal:      Cervical back: Neck supple.   Skin:     General: Skin is warm and dry.   Neurological:      Mental Status: He is alert and oriented to person, place, and time.   Psychiatric:         Mood and Affect: Mood normal.        Result Review :                Assessment and Plan   Diagnoses and all orders for this visit:    1. Type 2 diabetes mellitus without complication, without long-term current use of insulin ([facility]) (Primary)  -     Hemoglobin A1c  -     Comprehensive metabolic panel  -     Microalbumin / Creatinine Urine Ratio - Urine, Clean Catch    Other orders  -     amLODIPine (NORVASC) 10 MG tablet; Take 1 tablet by mouth Daily.  Dispense: 90 tablet; Refill: 1  -     metFORMIN (GLUCOPHAGE) 500 MG tablet; Take 2 tablets by mouth 2 (Two) Times a Day.  Dispense: 360 tablet; Refill: 1      HTN: stable, chronic, 116/78, continue amlodipine and hctz, with losartan  DM: A1C at goal with last labs, will repeat cmp and A1C today, continue ozempic 1 mg and metformin with farxiga  Follow up in one year, sooner if needed  cologuard utd  Declines vaccines today         Follow Up   No follow-ups on file.  Patient was given instructions and " counseling regarding his condition or for health maintenance advice. Please see specific information pulled into the AVS if appropriate.

## 2022-09-17 LAB
ALBUMIN SERPL-MCNC: 4.7 G/DL (ref 3.5–5.2)
ALBUMIN/CREAT UR: <10 MG/G CREAT (ref 0–29)
ALBUMIN/GLOB SERPL: 2.4 G/DL
ALP SERPL-CCNC: 62 U/L (ref 39–117)
ALT SERPL-CCNC: 33 U/L (ref 1–41)
AST SERPL-CCNC: 23 U/L (ref 1–40)
BILIRUB SERPL-MCNC: 0.5 MG/DL (ref 0–1.2)
BUN SERPL-MCNC: 20 MG/DL (ref 6–20)
BUN/CREAT SERPL: 24.4 (ref 7–25)
CALCIUM SERPL-MCNC: 9.8 MG/DL (ref 8.6–10.5)
CHLORIDE SERPL-SCNC: 102 MMOL/L (ref 98–107)
CO2 SERPL-SCNC: 22.8 MMOL/L (ref 22–29)
CREAT SERPL-MCNC: 0.82 MG/DL (ref 0.76–1.27)
CREAT UR-MCNC: 31 MG/DL
EGFRCR SERPLBLD CKD-EPI 2021: 103.1 ML/MIN/1.73
GLOBULIN SER CALC-MCNC: 2 GM/DL
GLUCOSE SERPL-MCNC: 117 MG/DL (ref 65–99)
HBA1C MFR BLD: 6.7 % (ref 4.8–5.6)
MICROALBUMIN UR-MCNC: <3 UG/ML
POTASSIUM SERPL-SCNC: 4.1 MMOL/L (ref 3.5–5.2)
PROT SERPL-MCNC: 6.7 G/DL (ref 6–8.5)
SODIUM SERPL-SCNC: 140 MMOL/L (ref 136–145)

## 2022-10-04 RX ORDER — MELOXICAM 7.5 MG/1
TABLET ORAL
Qty: 30 TABLET | Refills: 0 | Status: SHIPPED | OUTPATIENT
Start: 2022-10-04 | End: 2022-11-10 | Stop reason: SDUPTHER

## 2022-10-04 NOTE — TELEPHONE ENCOUNTER
Rx Refill Note  Requested Prescriptions     Pending Prescriptions Disp Refills   • meloxicam (MOBIC) 7.5 MG tablet [Pharmacy Med Name: MELOXICAM 7.5 MG TABLET] 30 tablet 5     Sig: TAKE ONE TABLET BY MOUTH DAILY      Last office visit with prescribing clinician: 9/16/2022      Next office visit with prescribing clinician: 3/16/2023       {TIP  Please add Last Relevant Lab Date if appropriate: 09/16/22    Sabina Saravia MA  10/04/22, 08:19 EDT

## 2022-11-10 RX ORDER — MELOXICAM 7.5 MG/1
7.5 TABLET ORAL DAILY
Qty: 30 TABLET | Refills: 0 | Status: SHIPPED | OUTPATIENT
Start: 2022-11-10 | End: 2022-12-05

## 2022-12-05 RX ORDER — MELOXICAM 7.5 MG/1
TABLET ORAL
Qty: 30 TABLET | Refills: 0 | Status: SHIPPED | OUTPATIENT
Start: 2022-12-05 | End: 2023-01-05

## 2022-12-05 RX ORDER — SEMAGLUTIDE 1.34 MG/ML
INJECTION, SOLUTION SUBCUTANEOUS
Qty: 9 ML | Refills: 1 | Status: SHIPPED | OUTPATIENT
Start: 2022-12-05

## 2022-12-05 RX ORDER — LOSARTAN POTASSIUM 50 MG/1
TABLET ORAL
Qty: 90 TABLET | Refills: 1 | Status: SHIPPED | OUTPATIENT
Start: 2022-12-05

## 2022-12-05 RX ORDER — HYDROCHLOROTHIAZIDE 12.5 MG/1
TABLET ORAL
Qty: 90 TABLET | Refills: 1 | Status: SHIPPED | OUTPATIENT
Start: 2022-12-05

## 2022-12-12 ENCOUNTER — TELEPHONE (OUTPATIENT)
Dept: FAMILY MEDICINE CLINIC | Facility: CLINIC | Age: 57
End: 2022-12-12

## 2022-12-22 RX ORDER — DAPAGLIFLOZIN 10 MG/1
TABLET, FILM COATED ORAL
Qty: 90 TABLET | Refills: 1 | Status: SHIPPED | OUTPATIENT
Start: 2022-12-22

## 2022-12-22 RX ORDER — SILDENAFIL 25 MG/1
TABLET, FILM COATED ORAL
Qty: 90 TABLET | Refills: 1 | Status: SHIPPED | OUTPATIENT
Start: 2022-12-22

## 2023-01-05 RX ORDER — MELOXICAM 7.5 MG/1
TABLET ORAL
Qty: 90 TABLET | Refills: 0 | Status: SHIPPED | OUTPATIENT
Start: 2023-01-05 | End: 2023-03-16

## 2023-01-05 NOTE — TELEPHONE ENCOUNTER
Rx Refill Note  Requested Prescriptions     Pending Prescriptions Disp Refills   • meloxicam (MOBIC) 7.5 MG tablet [Pharmacy Med Name: MELOXICAM 7.5 MG TABLET] 30 tablet 0     Sig: TAKE ONE TABLET BY MOUTH DAILY      Last office visit with prescribing clinician: 9/16/2022   Last telemedicine visit with prescribing clinician: 3/16/2023   Next office visit with prescribing clinician: 3/16/2023       {TIP  Please add Last Relevant Lab Date if appropriate: 09/16/22                 Would you like a call back once the refill request has been completed: [] Yes [] No    If the office needs to give you a call back, can they leave a voicemail: [] Yes [] No    Sabina Saravia MA  01/05/23, 09:34 EST

## 2023-03-16 ENCOUNTER — OFFICE VISIT (OUTPATIENT)
Dept: FAMILY MEDICINE CLINIC | Facility: CLINIC | Age: 58
End: 2023-03-16
Payer: COMMERCIAL

## 2023-03-16 VITALS
HEIGHT: 69 IN | BODY MASS INDEX: 40.42 KG/M2 | DIASTOLIC BLOOD PRESSURE: 74 MMHG | HEART RATE: 74 BPM | WEIGHT: 272.9 LBS | SYSTOLIC BLOOD PRESSURE: 118 MMHG | OXYGEN SATURATION: 97 % | TEMPERATURE: 96.4 F | RESPIRATION RATE: 18 BRPM

## 2023-03-16 DIAGNOSIS — E78.1 HYPERTRIGLYCERIDEMIA: ICD-10-CM

## 2023-03-16 DIAGNOSIS — M19.90 OSTEOARTHRITIS, UNSPECIFIED OSTEOARTHRITIS TYPE, UNSPECIFIED SITE: ICD-10-CM

## 2023-03-16 DIAGNOSIS — E11.9 TYPE 2 DIABETES MELLITUS WITHOUT COMPLICATION, WITHOUT LONG-TERM CURRENT USE OF INSULIN: Primary | ICD-10-CM

## 2023-03-16 DIAGNOSIS — I10 ESSENTIAL HYPERTENSION: ICD-10-CM

## 2023-03-16 DIAGNOSIS — Z13.29 SCREENING FOR THYROID DISORDER: ICD-10-CM

## 2023-03-16 PROCEDURE — 99214 OFFICE O/P EST MOD 30 MIN: CPT | Performed by: NURSE PRACTITIONER

## 2023-03-16 RX ORDER — MELOXICAM 15 MG/1
15 TABLET ORAL DAILY
Qty: 90 TABLET | Refills: 1 | Status: SHIPPED | OUTPATIENT
Start: 2023-03-16

## 2023-03-16 NOTE — PROGRESS NOTES
"Chief Complaint  Diabetes    Subjective        Raymundo Fall presents to [facility]  History of Present Illness      The patient is a 57-year-old male who presents today for an annual physical exam and medication follow-up.    The patient reports he is doing well overall. He is moving to [place], [place] now. He sold his house and got  in 07/2022.    He did not receive an influenza vaccine this season. He declines the COVID-19 booster.    He was advised on having a DEXA scan due to osteopenia in his right hip. It has been 5 years since he had a DEXA scan. He was having hip problems back then. He retired 2 years ago.    He had an eye exam on 03/14/2023. His vision changed a little bit.    He is still utilizing Ozempic. He did not get it for a month and it was on backorder. He finally got it and has not had any issues since. He is taking metformin 4 tablets a day and Farxiga.    He reports he has hard spots around his umbilical hernia. It hurts sometimes.    He reports his knee has always been acting up since his meniscus repair in 2020. He is still taking meloxicam 7.5 mg.    He did a Cologuard in 2021.    Objective   Vital Signs:  /74 (BP Location: Right arm, Patient Position: Sitting, Cuff Size: Adult)   Pulse 74   Temp 96.4 °F (35.8 °C) (Temporal)   Resp 18   Ht 175.3 cm (69.02\")   Wt 124 kg (272 lb 14.4 oz)   SpO2 97%   BMI 40.28 kg/m²   Estimated body mass index is 40.28 kg/m² as calculated from the following:    Height as of this encounter: 175.3 cm (69.02\").    Weight as of this encounter: 124 kg (272 lb 14.4 oz).          Physical Exam  Vitals reviewed.   Constitutional:       Appearance: Normal appearance.   Cardiovascular:      Rate and Rhythm: Normal rate and regular rhythm.      Heart sounds: No murmur heard.    No friction rub. No gallop.   Pulmonary:      Effort: Pulmonary effort is normal. No respiratory distress.      Breath sounds: Normal " breath sounds. No wheezing, rhonchi or rales.   Skin:     General: Skin is warm and dry.   Neurological:      Mental Status: He is alert and oriented to person, place, and time.   Psychiatric:         Mood and Affect: Mood normal.        Result Review :                  Assessment and Plan   Diagnoses and all orders for this visit:    1. Type 2 diabetes mellitus without complication, without long-term current use of insulin (HCC) (Primary)  -     CBC & Differential  -     Comprehensive Metabolic Panel  -     Hemoglobin A1c    2. Essential hypertension  -     CBC & Differential  -     Comprehensive Metabolic Panel    3. Hypertriglyceridemia  -     Lipid Panel With LDL / HDL Ratio    4. Screening for thyroid disorder  -     TSH Rfx On Abnormal To Free T4    5. Osteoarthritis, unspecified osteoarthritis type, unspecified site    Other orders  -     meloxicam (MOBIC) 15 MG tablet; Take 1 tablet by mouth Daily.  Dispense: 90 tablet; Refill: 1              1. Annual physical exam (Primary)  - Patient is doing well overall. We are going to check his hemoglobin A1c, CMP, and routine labs. He is due for a physical at his next visit. We reviewed vaccine status as his colonoscopy is up-to-date.     2. Type 2 diabetes mellitus without complication, without long-term current use of insulin (HCC)  - Continue current regime.     OA: increase meloxicam to 15 mg daily    Follow Up   Return in about 6 months (around 9/16/2023) for Annual physical.  Patient was given instructions and counseling regarding his condition or for health maintenance advice. Please see specific information pulled into the AVS if appropriate.       Transcribed from ambient dictation for THANH Domingo by Milton Villanueva.  03/16/23   13:53 EDT    Patient or patient representative verbalized consent to the visit recording.  I have personally performed the services described in this document as transcribed by the above individual, and it is both accurate  and complete.  Answers for HPI/ROS submitted by the patient on 3/9/2023  What is the primary reason for your visit?: High Blood Pressure

## 2023-03-17 LAB
ALBUMIN SERPL-MCNC: 4.7 G/DL (ref 3.8–4.9)
ALBUMIN/GLOB SERPL: 2 {RATIO} (ref 1.2–2.2)
ALP SERPL-CCNC: 67 IU/L (ref 44–121)
ALT SERPL-CCNC: 48 IU/L (ref 0–44)
AST SERPL-CCNC: 28 IU/L (ref 0–40)
BASOPHILS # BLD AUTO: 0 X10E3/UL (ref 0–0.2)
BASOPHILS NFR BLD AUTO: 1 %
BILIRUB SERPL-MCNC: 0.6 MG/DL (ref 0–1.2)
BUN SERPL-MCNC: 14 MG/DL (ref 6–24)
BUN/CREAT SERPL: 16 (ref 9–20)
CALCIUM SERPL-MCNC: 9.4 MG/DL (ref 8.7–10.2)
CHLORIDE SERPL-SCNC: 102 MMOL/L (ref 96–106)
CHOLEST SERPL-MCNC: 145 MG/DL (ref 100–199)
CO2 SERPL-SCNC: 21 MMOL/L (ref 20–29)
CREAT SERPL-MCNC: 0.85 MG/DL (ref 0.76–1.27)
EGFRCR SERPLBLD CKD-EPI 2021: 102 ML/MIN/1.73
EOSINOPHIL # BLD AUTO: 0.4 X10E3/UL (ref 0–0.4)
EOSINOPHIL NFR BLD AUTO: 6 %
ERYTHROCYTE [DISTWIDTH] IN BLOOD BY AUTOMATED COUNT: 12.5 % (ref 11.6–15.4)
GLOBULIN SER CALC-MCNC: 2.4 G/DL (ref 1.5–4.5)
GLUCOSE SERPL-MCNC: 119 MG/DL (ref 70–99)
HBA1C MFR BLD: 6.9 % (ref 4.8–5.6)
HCT VFR BLD AUTO: 52.1 % (ref 37.5–51)
HDLC SERPL-MCNC: 41 MG/DL
HGB BLD-MCNC: 17.4 G/DL (ref 13–17.7)
IMM GRANULOCYTES # BLD AUTO: 0 X10E3/UL (ref 0–0.1)
IMM GRANULOCYTES NFR BLD AUTO: 0 %
LDLC SERPL CALC-MCNC: 80 MG/DL (ref 0–99)
LDLC/HDLC SERPL: 2 RATIO (ref 0–3.6)
LYMPHOCYTES # BLD AUTO: 1.5 X10E3/UL (ref 0.7–3.1)
LYMPHOCYTES NFR BLD AUTO: 27 %
MCH RBC QN AUTO: 28.6 PG (ref 26.6–33)
MCHC RBC AUTO-ENTMCNC: 33.4 G/DL (ref 31.5–35.7)
MCV RBC AUTO: 86 FL (ref 79–97)
MONOCYTES # BLD AUTO: 0.6 X10E3/UL (ref 0.1–0.9)
MONOCYTES NFR BLD AUTO: 10 %
NEUTROPHILS # BLD AUTO: 3.3 X10E3/UL (ref 1.4–7)
NEUTROPHILS NFR BLD AUTO: 56 %
PLATELET # BLD AUTO: 156 X10E3/UL (ref 150–450)
POTASSIUM SERPL-SCNC: 4.2 MMOL/L (ref 3.5–5.2)
PROT SERPL-MCNC: 7.1 G/DL (ref 6–8.5)
RBC # BLD AUTO: 6.09 X10E6/UL (ref 4.14–5.8)
SODIUM SERPL-SCNC: 139 MMOL/L (ref 134–144)
TRIGL SERPL-MCNC: 138 MG/DL (ref 0–149)
TSH SERPL DL<=0.005 MIU/L-ACNC: 1.71 UIU/ML (ref 0.45–4.5)
VLDLC SERPL CALC-MCNC: 24 MG/DL (ref 5–40)
WBC # BLD AUTO: 5.7 X10E3/UL (ref 3.4–10.8)

## 2023-03-22 RX ORDER — AMLODIPINE BESYLATE 10 MG/1
TABLET ORAL
Qty: 90 TABLET | Refills: 1 | Status: SHIPPED | OUTPATIENT
Start: 2023-03-22

## 2023-05-31 RX ORDER — SILDENAFIL 25 MG/1
TABLET, FILM COATED ORAL
Qty: 90 TABLET | Refills: 1 | Status: SHIPPED | OUTPATIENT
Start: 2023-05-31

## 2023-05-31 RX ORDER — SILDENAFIL 25 MG/1
25 TABLET, FILM COATED ORAL DAILY PRN
Qty: 90 TABLET | Refills: 1 | OUTPATIENT
Start: 2023-05-31

## 2023-09-15 RX ORDER — AMLODIPINE BESYLATE 10 MG/1
TABLET ORAL
Qty: 90 TABLET | Refills: 1 | Status: SHIPPED | OUTPATIENT
Start: 2023-09-15

## 2023-09-15 RX ORDER — MELOXICAM 15 MG/1
TABLET ORAL
Qty: 90 TABLET | Refills: 1 | Status: SHIPPED | OUTPATIENT
Start: 2023-09-15

## 2023-09-19 ENCOUNTER — OFFICE VISIT (OUTPATIENT)
Dept: FAMILY MEDICINE CLINIC | Facility: CLINIC | Age: 58
End: 2023-09-19
Payer: COMMERCIAL

## 2023-09-19 VITALS
OXYGEN SATURATION: 98 % | BODY MASS INDEX: 40.67 KG/M2 | HEART RATE: 78 BPM | WEIGHT: 274.6 LBS | TEMPERATURE: 97.3 F | RESPIRATION RATE: 18 BRPM | SYSTOLIC BLOOD PRESSURE: 126 MMHG | HEIGHT: 69 IN | DIASTOLIC BLOOD PRESSURE: 68 MMHG

## 2023-09-19 DIAGNOSIS — Z00.00 ANNUAL PHYSICAL EXAM: Primary | ICD-10-CM

## 2023-09-19 DIAGNOSIS — Z13.29 SCREENING FOR THYROID DISORDER: ICD-10-CM

## 2023-09-19 DIAGNOSIS — Z12.5 SCREENING FOR PROSTATE CANCER: ICD-10-CM

## 2023-09-19 DIAGNOSIS — I10 ESSENTIAL HYPERTENSION: ICD-10-CM

## 2023-09-19 DIAGNOSIS — E78.1 HYPERTRIGLYCERIDEMIA: ICD-10-CM

## 2023-09-19 DIAGNOSIS — E11.9 TYPE 2 DIABETES MELLITUS WITHOUT COMPLICATION, WITHOUT LONG-TERM CURRENT USE OF INSULIN: ICD-10-CM

## 2023-09-19 NOTE — PROGRESS NOTES
"Chief Complaint  Annual Exam    Subjective        Raymundo Fall presents to [facility]  History of Present Illness    The patient is a 58-year-old male who presents today for an annual examination.    He is currently taking Ozempic, metformin, losartan, hydrochlorothiazide, and amlodipine. He denies needing any medication refills today. The patient is doing well taking Ozempic and generally receives a 3-month supply of Ozempic with each refill. He is currently awaiting restock of Ozempic.      Objective   Vital Signs:  /68 (BP Location: Right arm, Patient Position: Sitting, Cuff Size: Adult)   Pulse 78   Temp 97.3 °F (36.3 °C) (Temporal)   Resp 18   Ht 175.3 cm (69.02\")   Wt 125 kg (274 lb 9.6 oz)   SpO2 98%   BMI 40.53 kg/m²   Estimated body mass index is 40.53 kg/m² as calculated from the following:    Height as of this encounter: 175.3 cm (69.02\").    Weight as of this encounter: 125 kg (274 lb 9.6 oz).          Physical Exam  Vitals reviewed.   Constitutional:       General: He is not in acute distress.     Appearance: He is well-developed. He is not diaphoretic.   HENT:      Head: Normocephalic and atraumatic.      Right Ear: Tympanic membrane, ear canal and external ear normal.      Left Ear: Tympanic membrane, ear canal and external ear normal.      Nose: Nose normal.      Mouth/Throat:      Pharynx: Uvula midline. No oropharyngeal exudate.   Cardiovascular:      Rate and Rhythm: Normal rate and regular rhythm.      Heart sounds: Normal heart sounds. No murmur heard.    No friction rub. No gallop.   Pulmonary:      Effort: Pulmonary effort is normal. No respiratory distress.      Breath sounds: Normal breath sounds. No wheezing or rales.   Abdominal:      General: Bowel sounds are normal. There is no distension.      Palpations: Abdomen is soft.      Tenderness: There is no abdominal tenderness.   Musculoskeletal:      Cervical back: Neck supple.   Skin:     " General: Skin is warm and dry.   Neurological:      Mental Status: He is alert and oriented to person, place, and time.      Result Review :                  Assessment and Plan   Diagnoses and all orders for this visit:    1. Annual physical exam (Primary)  -     PSA Screen  -     CBC & Differential  -     Comprehensive Metabolic Panel  -     Lipid Panel With LDL / HDL Ratio  -     TSH Rfx On Abnormal To Free T4  -     Hemoglobin A1c    2. Type 2 diabetes mellitus without complication, without long-term current use of insulin  -     Comprehensive Metabolic Panel  -     Hemoglobin A1c  -     Microalbumin / Creatinine Urine Ratio - Urine, Clean Catch    3. Essential hypertension  -     CBC & Differential  -     Comprehensive Metabolic Panel    4. Hypertriglyceridemia  -     CBC & Differential  -     Comprehensive Metabolic Panel  -     Lipid Panel With LDL / HDL Ratio    5. Screening for thyroid disorder  -     TSH Rfx On Abnormal To Free T4    6. Screening for prostate cancer  -     PSA Screen      1. Annual physical examination  - The patient will complete laboratory studies including urinalysis and blood work.  - He prefers to delay updating pneumonia vaccination until age 65 years. He declines influenza vaccination today. The patient was informed that he may obtain herpes zoster vaccination and the new COVID-19 vaccine at a pharmacy.  - Repeat Cologuard test will be due in 2024.    2. Osteopenia  - He declines repeat DEXA scan for the time being.  - Continuing to walk for exercise was encouraged.      Information/counseling provided to the patient regarding periodic christian maintenance recommendations, including but not limited to immunizations, diet/exercise/healthy lifestyle, laboratory, and other screenings. BMI is discussed. Appropriate exercise, diet, and weight plans are discussed.         Follow Up   No follow-ups on file.  Patient was given instructions and counseling regarding his condition or for health  maintenance advice. Please see specific information pulled into the AVS if appropriate.       Transcribed from ambient dictation for THANH Domingo by Karin Gil.  09/19/23   13:17 EDT    Patient or patient representative verbalized consent to the visit recording.  I have personally performed the services described in this document as transcribed by the above individual, and it is both accurate and complete.

## 2023-09-20 LAB
ALBUMIN SERPL-MCNC: 4.7 G/DL (ref 3.5–5.2)
ALBUMIN/CREAT UR: <19 MG/G CREAT (ref 0–29)
ALBUMIN/GLOB SERPL: 1.9 G/DL
ALP SERPL-CCNC: 65 U/L (ref 39–117)
ALT SERPL-CCNC: 53 U/L (ref 1–41)
AST SERPL-CCNC: 32 U/L (ref 1–40)
BASOPHILS # BLD AUTO: 0.04 10*3/MM3 (ref 0–0.2)
BASOPHILS NFR BLD AUTO: 0.9 % (ref 0–1.5)
BILIRUB SERPL-MCNC: 0.7 MG/DL (ref 0–1.2)
BUN SERPL-MCNC: 15 MG/DL (ref 6–20)
BUN/CREAT SERPL: 21.1 (ref 7–25)
CALCIUM SERPL-MCNC: 9.6 MG/DL (ref 8.6–10.5)
CHLORIDE SERPL-SCNC: 101 MMOL/L (ref 98–107)
CHOLEST SERPL-MCNC: 155 MG/DL (ref 0–200)
CO2 SERPL-SCNC: 23.3 MMOL/L (ref 22–29)
CREAT SERPL-MCNC: 0.71 MG/DL (ref 0.76–1.27)
CREAT UR-MCNC: 16.1 MG/DL
EGFRCR SERPLBLD CKD-EPI 2021: 107 ML/MIN/1.73
EOSINOPHIL # BLD AUTO: 0.22 10*3/MM3 (ref 0–0.4)
EOSINOPHIL NFR BLD AUTO: 4.8 % (ref 0.3–6.2)
ERYTHROCYTE [DISTWIDTH] IN BLOOD BY AUTOMATED COUNT: 12.6 % (ref 12.3–15.4)
GLOBULIN SER CALC-MCNC: 2.5 GM/DL
GLUCOSE SERPL-MCNC: 132 MG/DL (ref 65–99)
HBA1C MFR BLD: 7.1 % (ref 4.8–5.6)
HCT VFR BLD AUTO: 50.7 % (ref 37.5–51)
HDLC SERPL-MCNC: 42 MG/DL (ref 40–60)
HGB BLD-MCNC: 17.3 G/DL (ref 13–17.7)
IMM GRANULOCYTES # BLD AUTO: 0.01 10*3/MM3 (ref 0–0.05)
IMM GRANULOCYTES NFR BLD AUTO: 0.2 % (ref 0–0.5)
LDLC SERPL CALC-MCNC: 92 MG/DL (ref 0–100)
LDLC/HDLC SERPL: 2.15 {RATIO}
LYMPHOCYTES # BLD AUTO: 1.66 10*3/MM3 (ref 0.7–3.1)
LYMPHOCYTES NFR BLD AUTO: 36 % (ref 19.6–45.3)
MCH RBC QN AUTO: 29.8 PG (ref 26.6–33)
MCHC RBC AUTO-ENTMCNC: 34.1 G/DL (ref 31.5–35.7)
MCV RBC AUTO: 87.3 FL (ref 79–97)
MICROALBUMIN UR-MCNC: <3 UG/ML
MONOCYTES # BLD AUTO: 0.43 10*3/MM3 (ref 0.1–0.9)
MONOCYTES NFR BLD AUTO: 9.3 % (ref 5–12)
NEUTROPHILS # BLD AUTO: 2.25 10*3/MM3 (ref 1.7–7)
NEUTROPHILS NFR BLD AUTO: 48.8 % (ref 42.7–76)
NRBC BLD AUTO-RTO: 0 /100 WBC (ref 0–0.2)
PLATELET # BLD AUTO: 155 10*3/MM3 (ref 140–450)
POTASSIUM SERPL-SCNC: 4 MMOL/L (ref 3.5–5.2)
PROT SERPL-MCNC: 7.2 G/DL (ref 6–8.5)
PSA SERPL-MCNC: 0.31 NG/ML (ref 0–4)
RBC # BLD AUTO: 5.81 10*6/MM3 (ref 4.14–5.8)
SODIUM SERPL-SCNC: 137 MMOL/L (ref 136–145)
TRIGL SERPL-MCNC: 114 MG/DL (ref 0–150)
TSH SERPL DL<=0.005 MIU/L-ACNC: 1.64 UIU/ML (ref 0.27–4.2)
VLDLC SERPL CALC-MCNC: 21 MG/DL (ref 5–40)
WBC # BLD AUTO: 4.61 10*3/MM3 (ref 3.4–10.8)

## 2023-09-26 RX ORDER — SEMAGLUTIDE 2.68 MG/ML
2 INJECTION, SOLUTION SUBCUTANEOUS WEEKLY
Qty: 9 ML | Refills: 1 | Status: SHIPPED | OUTPATIENT
Start: 2023-09-26

## 2023-10-03 RX ORDER — SILDENAFIL 25 MG/1
TABLET, FILM COATED ORAL
Qty: 90 TABLET | Refills: 1 | Status: SHIPPED | OUTPATIENT
Start: 2023-10-03

## 2023-11-20 RX ORDER — SILDENAFIL 25 MG/1
TABLET, FILM COATED ORAL
Qty: 90 TABLET | Refills: 1 | Status: SHIPPED | OUTPATIENT
Start: 2023-11-20

## 2023-12-14 RX ORDER — LOSARTAN POTASSIUM 50 MG/1
50 TABLET ORAL DAILY
Qty: 90 TABLET | Refills: 1 | Status: SHIPPED | OUTPATIENT
Start: 2023-12-14

## 2023-12-14 RX ORDER — HYDROCHLOROTHIAZIDE 12.5 MG/1
12.5 TABLET ORAL DAILY
Qty: 90 TABLET | Refills: 1 | Status: SHIPPED | OUTPATIENT
Start: 2023-12-14

## 2023-12-14 RX ORDER — DAPAGLIFLOZIN 10 MG/1
1 TABLET, FILM COATED ORAL DAILY
Qty: 90 TABLET | Refills: 1 | Status: SHIPPED | OUTPATIENT
Start: 2023-12-14

## 2023-12-15 ENCOUNTER — TELEPHONE (OUTPATIENT)
Dept: FAMILY MEDICINE CLINIC | Facility: CLINIC | Age: 58
End: 2023-12-15

## 2023-12-15 NOTE — TELEPHONE ENCOUNTER
Caller: Raymundo Fall    Relationship: Self    Best call back number: [phone]     What was the call regarding: PATIENT MESSAGE FROM 10/19/2023.    PATIENT HASN'T HEARD ANYTHING AND HAS BEEN WAITING FOR MONTHS FOR ANY UPDATES ON ORDERS FOR A NEW CPAP MACHINE.

## 2024-01-02 NOTE — TELEPHONE ENCOUNTER
PATIENT STATES THAT [facility] STILL HAS NOT RECEIVED THE FAX FOR THE CPAP MACHINE FROM [facility]. PATIENT STATES THAT HE HAS BEEN TRYING TO GET THIS SINCE OCTOBER AND NOW HE HAS A NEW DEDUCTIBLE BECAUSE HE CANNOT GET THE PAPERWORK IN.     HUB COULD NOT WARM TRANSFER.    PATIENT WAS GIVEN TO THE FAX NUMBER FROM [facility], THIS [phone].    PLEASE FAX PAPERWORK TO [facility] AND CONTACT PATIENT WHEN THIS HAS BEEN DONE.

## 2024-01-02 NOTE — TELEPHONE ENCOUNTER
Please call deandres for appropriate paperwork, have sent paperwork in several times and not sure what the hold up is

## 2024-01-02 NOTE — TELEPHONE ENCOUNTER
Spoke to Jose at [facility] and she said patient is wanting new machine. Need to be put on the written order form and fax to [phone]

## 2024-01-31 DIAGNOSIS — G47.33 OSA ON CPAP: Primary | ICD-10-CM

## 2024-02-01 ENCOUNTER — TELEPHONE (OUTPATIENT)
Dept: FAMILY MEDICINE CLINIC | Facility: CLINIC | Age: 59
End: 2024-02-01
Payer: COMMERCIAL

## 2024-02-01 NOTE — TELEPHONE ENCOUNTER
Called and left  for Lisa with [facility] [phone] that we have referred him to sleep medicine to do his Cpap stuff and the settings. [facility] doesn't do this and he was under Dr Heath back in 2016 and that provider done this. But [facility] doesn't do this.

## 2024-02-15 ENCOUNTER — OFFICE VISIT (OUTPATIENT)
Dept: SLEEP MEDICINE | Facility: CLINIC | Age: 59
End: 2024-02-15
Payer: COMMERCIAL

## 2024-02-15 VITALS
RESPIRATION RATE: 12 BRPM | SYSTOLIC BLOOD PRESSURE: 129 MMHG | OXYGEN SATURATION: 94 % | HEIGHT: 69 IN | DIASTOLIC BLOOD PRESSURE: 81 MMHG | WEIGHT: 284 LBS | HEART RATE: 77 BPM | BODY MASS INDEX: 42.06 KG/M2

## 2024-02-15 DIAGNOSIS — G47.33 SEVERE OBSTRUCTIVE SLEEP APNEA: Primary | ICD-10-CM

## 2024-02-15 DIAGNOSIS — E66.01 SEVERE OBESITY (BMI >= 40): ICD-10-CM

## 2024-02-15 PROCEDURE — G0463 HOSPITAL OUTPT CLINIC VISIT: HCPCS

## 2024-03-05 RX ORDER — SEMAGLUTIDE 2.68 MG/ML
2 INJECTION, SOLUTION SUBCUTANEOUS WEEKLY
Qty: 9 ML | Refills: 1 | Status: SHIPPED | OUTPATIENT
Start: 2024-03-05

## 2024-03-05 RX ORDER — SEMAGLUTIDE 1.34 MG/ML
INJECTION, SOLUTION SUBCUTANEOUS
Qty: 9 ML | Refills: 1 | OUTPATIENT
Start: 2024-03-05

## 2024-03-11 RX ORDER — AMLODIPINE BESYLATE 10 MG/1
10 TABLET ORAL DAILY
Qty: 90 TABLET | Refills: 1 | Status: SHIPPED | OUTPATIENT
Start: 2024-03-11

## 2024-03-13 RX ORDER — SILDENAFIL 25 MG/1
TABLET, FILM COATED ORAL
Qty: 90 TABLET | Refills: 1 | Status: SHIPPED | OUTPATIENT
Start: 2024-03-13

## 2024-03-28 ENCOUNTER — OFFICE VISIT (OUTPATIENT)
Dept: SLEEP MEDICINE | Facility: CLINIC | Age: 59
End: 2024-03-28
Payer: COMMERCIAL

## 2024-03-28 VITALS
HEART RATE: 68 BPM | DIASTOLIC BLOOD PRESSURE: 76 MMHG | SYSTOLIC BLOOD PRESSURE: 116 MMHG | RESPIRATION RATE: 12 BRPM | WEIGHT: 280 LBS | OXYGEN SATURATION: 96 % | HEIGHT: 69 IN | BODY MASS INDEX: 41.47 KG/M2

## 2024-03-28 DIAGNOSIS — E66.01 SEVERE OBESITY (BMI >= 40): ICD-10-CM

## 2024-03-28 DIAGNOSIS — G47.33 SEVERE OBSTRUCTIVE SLEEP APNEA: Primary | ICD-10-CM

## 2024-03-28 PROCEDURE — G0463 HOSPITAL OUTPT CLINIC VISIT: HCPCS

## 2024-03-28 NOTE — PROGRESS NOTES
"  [address] [address]  Phone   Fax         SLEEP CLINIC FOLLOW-UP PROGRESS NOTE    Raymundo Fall  [id number]   1965  58 y.o.  male      PCP: [facility] Fields APRN    DATE OF VISIT: 3/28/2024          CHIEF COMPLAINT: Severe obstructive sleep apnea    HPI:  This is a 58 y.o. year old patient who presents to the clinic today for the management of obstructive sleep apnea. Patient presents today to establish care for treatment and management of obstructive sleep apnea.  Patient had sleep study done 7/17/2008 through Dr. Mckeon showing severe obstructive sleep apnea with AHI of 52.1/h.  This was a split-night study and patient was titrated to CPAP at 13 cm H2O.   He established with our office 2/2024 at which point his machine was over 5 years old and he was needing replacement.  He still felt he did well with the pressure at 13.  AHI was at goal.  New ResMed machine was ordered.  He now presents today for compliance check.  Feels great with machine, no issues or concerns.  Does have some mask leakage and we discussed possible alternate facemask versus chinstrap.      MEDICATIONS: reviewed     ALLERGIES:  Patient has no known allergies.    SOCIAL HISTORY (habits pertaining to sleep medicine):  Tobacco use: No   Alcohol use: 0 per week  Caffeine use: 3     REVIEW OF SYSTEMS:   Pertinent positive symptoms are:  [place] Sleepiness Scale :Total score: 2           PHYSICAL EXAMINATION:  CONSTITUTIONAL:  Vitals:    03/28/24 1013   BP: 116/76   BP Location: Left arm   Patient Position: Sitting   Cuff Size: Adult   Pulse: 68   Resp: 12   SpO2: 96%   Weight: 127 kg (280 lb)   Height: 175.3 cm (69\")    Body mass index is 41.35 kg/m².   HEAD: atraumatic, normocephalic  RESP SYSTEM: not in respiratory distress, breathing unlabored  CARDIOVASULAR: normal rate, no edema noted   NEURO: Alert and oriented x 3, mood and affect appeared " appropriate      DATA REVIEWED:  The Smart card downloaded on 3/19/2024 has been reviewed independently by me for compliance and discussed the data with the patient.   Compliance: 90 %  More than 4 hr use: 90 %  Average use of the device: 8 hours 17 minutes per night  Residual AHI: 0.6 /hr (goal < 5.0 /hr)  Mask type: Nasal pillow, hose coming off the front (does not like masks with hose coming off the top)  Device: ResMed AirSense 11  DME: [employer]          ASSESSMENT AND PLAN:  Obstructive Sleep Apnea: sleep apnea has improved with the device and treatment.  Patient has excellent compliance with the device for treatment of sleep apnea.  I have personally reviewed the smart card download and discussed the download data with the patient and encouarged continued use of the device.  The residual AHI is acceptable. The device is benefiting the patient and the device is medically necessary.  Recommend patient get supplies from the DME company, change them on a regular basis, and clean as directed.  A prescription for supplies has been sent to the [employer] [facility].  Also recommend using CPAP a minimum of 4 hours per night to meet insurance criteria, all night every night recommended for optimum health.  Recommend prioritizing sleep to aid in overall health.  Address mask leak with [employer] company.  Try chinstrap.  Obesity: Body mass index is 41.35 kg/m².. Patients who are overweight or obese are at increased risk of sleep apnea/ sleep disordered breathing. Weight reduction and healthy lifestyle are encouraged in overweight/ obese patients as part of a comprehensive approach to sleep apnea treatment.    Hypertension      Patient will follow-up in 6 months or follow-up sooner for any issues or concerns.  Patient's questions were answered.          Thank you for allowing me to participate in the care of this patient.     Merline Shah, JESUS, APRN  [facility]

## 2024-04-04 ENCOUNTER — OFFICE VISIT (OUTPATIENT)
Dept: FAMILY MEDICINE CLINIC | Facility: CLINIC | Age: 59
End: 2024-04-04
Payer: COMMERCIAL

## 2024-04-04 VITALS
WEIGHT: 291.2 LBS | OXYGEN SATURATION: 98 % | DIASTOLIC BLOOD PRESSURE: 80 MMHG | HEIGHT: 69 IN | RESPIRATION RATE: 18 BRPM | BODY MASS INDEX: 43.13 KG/M2 | HEART RATE: 68 BPM | SYSTOLIC BLOOD PRESSURE: 132 MMHG | TEMPERATURE: 96.6 F

## 2024-04-04 DIAGNOSIS — E11.9 TYPE 2 DIABETES MELLITUS WITHOUT COMPLICATION, WITHOUT LONG-TERM CURRENT USE OF INSULIN: ICD-10-CM

## 2024-04-04 DIAGNOSIS — M25.561 CHRONIC PAIN OF RIGHT KNEE: ICD-10-CM

## 2024-04-04 DIAGNOSIS — I10 ESSENTIAL HYPERTENSION: Primary | ICD-10-CM

## 2024-04-04 DIAGNOSIS — G89.29 CHRONIC PAIN OF RIGHT KNEE: ICD-10-CM

## 2024-04-04 RX ORDER — MELOXICAM 15 MG/1
15 TABLET ORAL DAILY
Qty: 90 TABLET | Refills: 1 | Status: SHIPPED | OUTPATIENT
Start: 2024-04-04

## 2024-04-04 NOTE — PROGRESS NOTES
"Chief Complaint  Diabetes    Subjective        Raymundo Fall presents to [facility]  History of Present Illness  Raymundo Fall is a 58-year-old male patient who presents to the office for follow-up of diabetes mellitus and hypertension.    Mr. Fall reports he is doing well. His blood pressure is slightly elevated in the office today at 132/80 mmHg. He currently utilizes antihypertensive medication.       The patient is currently on a regimen of Ozempic 2 mg and metformin, with no reported issues in obtaining the medication. He is prepared to do lab work today.     The patient underwent a bone density screening in the distant past due to a history of stress fractures. He engages in regular physical activity, specifically walking.     He has obstructive sleep apnea and recently acquired a new CPAP machine, which took approximately 6 months to process.    The patient's knee has begun to bother him again. Walking does not alleviate this discomfort. He acknowledges the need for additional injections, which were previously administered by Dr. Cleaning who also performed his surgery for a torn meniscus in 02/2020. Dr. Cleaning has proposed the use of gel for his knee condition due to bone-on-bone contact. The patient was advised to return if his condition deteriorates.    The patient is  and lives in [place], [place].     Objective   Vital Signs:  /80 (BP Location: Right arm, Patient Position: Sitting, Cuff Size: Adult)   Pulse 68   Temp 96.6 °F (35.9 °C) (Temporal)   Resp 18   Ht 175.3 cm (69.02\")   Wt 132 kg (291 lb 3.2 oz)   SpO2 98%   BMI 42.98 kg/m²   Estimated body mass index is 42.98 kg/m² as calculated from the following:    Height as of this encounter: 175.3 cm (69.02\").    Weight as of this encounter: 132 kg (291 lb 3.2 oz).            Physical Exam  Constitutional:       General: He is not in acute distress.     Appearance: He is well-developed. He " is not diaphoretic.   Cardiovascular:      Rate and Rhythm: Normal rate and regular rhythm.      Heart sounds: Normal heart sounds. No murmur heard.     No friction rub. No gallop.   Pulmonary:      Effort: Pulmonary effort is normal. No respiratory distress.      Breath sounds: Normal breath sounds. No wheezing or rales.   Abdominal:      General: Bowel sounds are normal. There is no distension.      Palpations: Abdomen is soft.      Tenderness: There is no abdominal tenderness.   Musculoskeletal:      Cervical back: Neck supple.   Skin:     General: Skin is warm and dry.   Neurological:      Mental Status: He is alert and oriented to person, place, and time.        Result Review :  Lab work completed in 09/2023 was reviewed with the patient.                Assessment and Plan   Diagnoses and all orders for this visit:    1. Essential hypertension (Primary)  -     CBC & Differential  -     Comprehensive Metabolic Panel  -     TSH Rfx On Abnormal To Free T4    2. Type 2 diabetes mellitus without complication, without long-term current use of insulin  -     Hemoglobin A1c  -     CBC & Differential  -     Comprehensive Metabolic Panel  -     Lipid Panel With LDL / HDL Ratio    3. Chronic pain of right knee        1. Diabetes mellitus  - The patient will continue the current regimen of metformin and Ozempic.   - He will complete a comprehensive set of laboratory tests today, including hemoglobin A1c.    2. Hypertension  - His blood pressure is slightly elevated in the office today at 132/80 mmHg.   - He will continue current medication regimen.   - He will complete fasting lab work today.    3. Knee pain  - He will follow up with orthopedics for further evaluation.           Follow Up   The patient will follow-up in 6 months.  Patient was given instructions and counseling regarding his condition or for health maintenance advice. Please see specific information pulled into the AVS if appropriate.       Transcribed from  ambient dictation for THANH Domingo by Marlyn Medina.  04/04/24   11:13 EDT    Patient or patient representative verbalized consent to the visit recording.  I have personally performed the services described in this document as transcribed by the above individual, and it is both accurate and complete.

## 2024-04-04 NOTE — TELEPHONE ENCOUNTER
Rx Refill Note  Requested Prescriptions     Pending Prescriptions Disp Refills    metFORMIN (GLUCOPHAGE) 500 MG tablet [Pharmacy Med Name: METFORMIN  MG TABLET] 360 tablet 1     Sig: TAKE 2 TABLETS BY MOUTH TWICE A DAY      Last office visit with prescribing clinician: 9/19/2023   Last telemedicine visit with prescribing clinician: Visit date not found   Next office visit with prescribing clinician: 4/4/2024       {TIP  Please add Last Relevant Lab Date if appropriate: 09/19/23                 Would you like a call back once the refill request has been completed: [] Yes [] No    If the office needs to give you a call back, can they leave a voicemail: [] Yes [] No    Sabina Saravia MA  04/04/24, 07:41 EDT

## 2024-04-05 LAB
ALBUMIN SERPL-MCNC: 4.4 G/DL (ref 3.5–5.2)
ALBUMIN/GLOB SERPL: 1.8 G/DL
ALP SERPL-CCNC: 64 U/L (ref 39–117)
ALT SERPL-CCNC: 59 U/L (ref 1–41)
AST SERPL-CCNC: 29 U/L (ref 1–40)
BASOPHILS # BLD AUTO: 0.02 10*3/MM3 (ref 0–0.2)
BASOPHILS NFR BLD AUTO: 0.4 % (ref 0–1.5)
BILIRUB SERPL-MCNC: 0.5 MG/DL (ref 0–1.2)
BUN SERPL-MCNC: 13 MG/DL (ref 6–20)
BUN/CREAT SERPL: 12.7 (ref 7–25)
CALCIUM SERPL-MCNC: 9.5 MG/DL (ref 8.6–10.5)
CHLORIDE SERPL-SCNC: 103 MMOL/L (ref 98–107)
CHOLEST SERPL-MCNC: 137 MG/DL (ref 0–200)
CO2 SERPL-SCNC: 23.9 MMOL/L (ref 22–29)
CREAT SERPL-MCNC: 1.02 MG/DL (ref 0.76–1.27)
EGFRCR SERPLBLD CKD-EPI 2021: 85.7 ML/MIN/1.73
EOSINOPHIL # BLD AUTO: 0.29 10*3/MM3 (ref 0–0.4)
EOSINOPHIL NFR BLD AUTO: 6.3 % (ref 0.3–6.2)
ERYTHROCYTE [DISTWIDTH] IN BLOOD BY AUTOMATED COUNT: 12.3 % (ref 12.3–15.4)
GLOBULIN SER CALC-MCNC: 2.5 GM/DL
GLUCOSE SERPL-MCNC: 142 MG/DL (ref 65–99)
HBA1C MFR BLD: 7.4 % (ref 4.8–5.6)
HCT VFR BLD AUTO: 48.5 % (ref 37.5–51)
HDLC SERPL-MCNC: 40 MG/DL (ref 40–60)
HGB BLD-MCNC: 16.6 G/DL (ref 13–17.7)
IMM GRANULOCYTES # BLD AUTO: 0 10*3/MM3 (ref 0–0.05)
IMM GRANULOCYTES NFR BLD AUTO: 0 % (ref 0–0.5)
LDLC SERPL CALC-MCNC: 76 MG/DL (ref 0–100)
LDLC/HDLC SERPL: 1.84 {RATIO}
LYMPHOCYTES # BLD AUTO: 1.37 10*3/MM3 (ref 0.7–3.1)
LYMPHOCYTES NFR BLD AUTO: 29.7 % (ref 19.6–45.3)
MCH RBC QN AUTO: 30.2 PG (ref 26.6–33)
MCHC RBC AUTO-ENTMCNC: 34.2 G/DL (ref 31.5–35.7)
MCV RBC AUTO: 88.3 FL (ref 79–97)
MONOCYTES # BLD AUTO: 0.48 10*3/MM3 (ref 0.1–0.9)
MONOCYTES NFR BLD AUTO: 10.4 % (ref 5–12)
NEUTROPHILS # BLD AUTO: 2.45 10*3/MM3 (ref 1.7–7)
NEUTROPHILS NFR BLD AUTO: 53.2 % (ref 42.7–76)
NRBC BLD AUTO-RTO: 0 /100 WBC (ref 0–0.2)
PLATELET # BLD AUTO: 148 10*3/MM3 (ref 140–450)
POTASSIUM SERPL-SCNC: 4.3 MMOL/L (ref 3.5–5.2)
PROT SERPL-MCNC: 6.9 G/DL (ref 6–8.5)
RBC # BLD AUTO: 5.49 10*6/MM3 (ref 4.14–5.8)
SODIUM SERPL-SCNC: 138 MMOL/L (ref 136–145)
TRIGL SERPL-MCNC: 117 MG/DL (ref 0–150)
TSH SERPL DL<=0.005 MIU/L-ACNC: 1.67 UIU/ML (ref 0.27–4.2)
VLDLC SERPL CALC-MCNC: 21 MG/DL (ref 5–40)
WBC # BLD AUTO: 4.61 10*3/MM3 (ref 3.4–10.8)

## 2024-05-02 ENCOUNTER — APPOINTMENT (OUTPATIENT)
Dept: CT IMAGING | Facility: HOSPITAL | Age: 59
End: 2024-05-02
Payer: COMMERCIAL

## 2024-05-02 ENCOUNTER — HOSPITAL ENCOUNTER (EMERGENCY)
Facility: HOSPITAL | Age: 59
Discharge: HOME | End: 2024-05-02
Attending: EMERGENCY MEDICINE
Payer: COMMERCIAL

## 2024-05-02 VITALS
OXYGEN SATURATION: 93 % | BODY MASS INDEX: 40.82 KG/M2 | HEIGHT: 69 IN | TEMPERATURE: 98.3 F | HEART RATE: 77 BPM | RESPIRATION RATE: 18 BRPM | WEIGHT: 275.57 LBS | DIASTOLIC BLOOD PRESSURE: 83 MMHG | SYSTOLIC BLOOD PRESSURE: 125 MMHG

## 2024-05-02 DIAGNOSIS — N20.1 URETEROLITHIASIS: Primary | ICD-10-CM

## 2024-05-02 DIAGNOSIS — N17.9 ACUTE KIDNEY INJURY: ICD-10-CM

## 2024-05-02 DIAGNOSIS — R10.9 FLANK PAIN: ICD-10-CM

## 2024-05-02 LAB
ANION GAP SERPL CALCULATED.3IONS-SCNC: 14 MMOL/L (ref 5–15)
BACTERIA UR QL AUTO: NORMAL /HPF
BASOPHILS # BLD AUTO: 0.05 10*3/MM3 (ref 0–0.2)
BASOPHILS NFR BLD AUTO: 0.6 % (ref 0–1.5)
BILIRUB UR QL STRIP: NEGATIVE
BUN SERPL-MCNC: 27 MG/DL (ref 6–20)
BUN/CREAT SERPL: 17.5 (ref 7–25)
CALCIUM SPEC-SCNC: 9.6 MG/DL (ref 8.6–10.5)
CHLORIDE SERPL-SCNC: 100 MMOL/L (ref 98–107)
CLARITY UR: CLEAR
CO2 SERPL-SCNC: 23 MMOL/L (ref 22–29)
COLOR UR: YELLOW
CREAT SERPL-MCNC: 1.54 MG/DL (ref 0.76–1.27)
DEPRECATED RDW RBC AUTO: 37.9 FL (ref 37–54)
EGFRCR SERPLBLD CKD-EPI 2021: 52.3 ML/MIN/1.73
EOSINOPHIL # BLD AUTO: 0.15 10*3/MM3 (ref 0–0.4)
EOSINOPHIL NFR BLD AUTO: 1.7 % (ref 0.3–6.2)
ERYTHROCYTE [DISTWIDTH] IN BLOOD BY AUTOMATED COUNT: 11.9 % (ref 12.3–15.4)
GLUCOSE SERPL-MCNC: 183 MG/DL (ref 65–99)
GLUCOSE UR STRIP-MCNC: ABNORMAL MG/DL
HCT VFR BLD AUTO: 51.1 % (ref 37.5–51)
HGB BLD-MCNC: 17.2 G/DL (ref 13–17.7)
HGB UR QL STRIP.AUTO: ABNORMAL
HOLD SPECIMEN: NORMAL
HOLD SPECIMEN: NORMAL
HYALINE CASTS UR QL AUTO: NORMAL /LPF
IMM GRANULOCYTES # BLD AUTO: 0.02 10*3/MM3 (ref 0–0.05)
IMM GRANULOCYTES NFR BLD AUTO: 0.2 % (ref 0–0.5)
KETONES UR QL STRIP: ABNORMAL
LEUKOCYTE ESTERASE UR QL STRIP.AUTO: NEGATIVE
LYMPHOCYTES # BLD AUTO: 1.43 10*3/MM3 (ref 0.7–3.1)
LYMPHOCYTES NFR BLD AUTO: 16.1 % (ref 19.6–45.3)
MCH RBC QN AUTO: 29.5 PG (ref 26.6–33)
MCHC RBC AUTO-ENTMCNC: 33.7 G/DL (ref 31.5–35.7)
MCV RBC AUTO: 87.7 FL (ref 79–97)
MONOCYTES # BLD AUTO: 0.79 10*3/MM3 (ref 0.1–0.9)
MONOCYTES NFR BLD AUTO: 8.9 % (ref 5–12)
NEUTROPHILS NFR BLD AUTO: 6.45 10*3/MM3 (ref 1.7–7)
NEUTROPHILS NFR BLD AUTO: 72.5 % (ref 42.7–76)
NITRITE UR QL STRIP: NEGATIVE
NRBC BLD AUTO-RTO: 0 /100 WBC (ref 0–0.2)
PH UR STRIP.AUTO: 5.5 [PH] (ref 5–8)
PLATELET # BLD AUTO: 168 10*3/MM3 (ref 140–450)
PMV BLD AUTO: 10 FL (ref 6–12)
POTASSIUM SERPL-SCNC: 3.9 MMOL/L (ref 3.5–5.2)
PROT UR QL STRIP: NEGATIVE
RBC # BLD AUTO: 5.83 10*6/MM3 (ref 4.14–5.8)
RBC # UR STRIP: NORMAL /HPF
REF LAB TEST METHOD: NORMAL
SODIUM SERPL-SCNC: 137 MMOL/L (ref 136–145)
SP GR UR STRIP: 1.01 (ref 1–1.03)
SQUAMOUS #/AREA URNS HPF: NORMAL /HPF
UROBILINOGEN UR QL STRIP: ABNORMAL
WBC # UR STRIP: NORMAL /HPF
WBC NRBC COR # BLD AUTO: 8.89 10*3/MM3 (ref 3.4–10.8)
WHOLE BLOOD HOLD COAG: NORMAL
WHOLE BLOOD HOLD SPECIMEN: NORMAL

## 2024-05-02 PROCEDURE — 96374 THER/PROPH/DIAG INJ IV PUSH: CPT

## 2024-05-02 PROCEDURE — 25010000002 KETOROLAC TROMETHAMINE PER 15 MG: Performed by: EMERGENCY MEDICINE

## 2024-05-02 PROCEDURE — 80048 BASIC METABOLIC PNL TOTAL CA: CPT | Performed by: EMERGENCY MEDICINE

## 2024-05-02 PROCEDURE — 85025 COMPLETE CBC W/AUTO DIFF WBC: CPT | Performed by: EMERGENCY MEDICINE

## 2024-05-02 PROCEDURE — 25810000003 LACTATED RINGERS PER 1000 ML: Performed by: EMERGENCY MEDICINE

## 2024-05-02 PROCEDURE — 96375 TX/PRO/DX INJ NEW DRUG ADDON: CPT

## 2024-05-02 PROCEDURE — 25010000002 HYDROMORPHONE 1 MG/ML SOLUTION: Performed by: EMERGENCY MEDICINE

## 2024-05-02 PROCEDURE — 25010000002 ONDANSETRON PER 1 MG: Performed by: EMERGENCY MEDICINE

## 2024-05-02 PROCEDURE — 99284 EMERGENCY DEPT VISIT MOD MDM: CPT

## 2024-05-02 PROCEDURE — 74176 CT ABD & PELVIS W/O CONTRAST: CPT

## 2024-05-02 PROCEDURE — 81001 URINALYSIS AUTO W/SCOPE: CPT | Performed by: EMERGENCY MEDICINE

## 2024-05-02 PROCEDURE — 96361 HYDRATE IV INFUSION ADD-ON: CPT

## 2024-05-02 RX ORDER — KETOROLAC TROMETHAMINE 30 MG/ML
15 INJECTION, SOLUTION INTRAMUSCULAR; INTRAVENOUS ONCE
Status: COMPLETED | OUTPATIENT
Start: 2024-05-02 | End: 2024-05-02

## 2024-05-02 RX ORDER — SODIUM CHLORIDE, SODIUM LACTATE, POTASSIUM CHLORIDE, CALCIUM CHLORIDE 600; 310; 30; 20 MG/100ML; MG/100ML; MG/100ML; MG/100ML
125 INJECTION, SOLUTION INTRAVENOUS CONTINUOUS
Status: DISCONTINUED | OUTPATIENT
Start: 2024-05-02 | End: 2024-05-02 | Stop reason: HOSPADM

## 2024-05-02 RX ORDER — ONDANSETRON 2 MG/ML
4 INJECTION INTRAMUSCULAR; INTRAVENOUS ONCE
Status: COMPLETED | OUTPATIENT
Start: 2024-05-02 | End: 2024-05-02

## 2024-05-02 RX ADMIN — HYDROMORPHONE HYDROCHLORIDE 1 MG: 1 INJECTION, SOLUTION INTRAMUSCULAR; INTRAVENOUS; SUBCUTANEOUS at 18:20

## 2024-05-02 RX ADMIN — SODIUM CHLORIDE, POTASSIUM CHLORIDE, SODIUM LACTATE AND CALCIUM CHLORIDE 125 ML/HR: 600; 310; 30; 20 INJECTION, SOLUTION INTRAVENOUS at 18:23

## 2024-05-02 RX ADMIN — ONDANSETRON 4 MG: 2 INJECTION INTRAMUSCULAR; INTRAVENOUS at 18:21

## 2024-05-02 RX ADMIN — KETOROLAC TROMETHAMINE 15 MG: 30 INJECTION, SOLUTION INTRAMUSCULAR at 18:23

## 2024-05-02 NOTE — ED PROVIDER NOTES
Subjective   History of Present Illness  58-year-old male presents with severe left flank pain.  He states he had passed a stone over the weekend he had some mild pain a couple days ago but then around 330 today developed severe pain.  He has had no fever no vomiting.  He has had history of stones in the past.  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    Fatty liver     Fatty Liver Disease    GERD (gastroesophageal reflux disease)     Headache     with blood sugar fluctuation    History of sepsis     following kidney stone surgery    Hot flashes     resolved after infection treated completed    Hypertension     Ingrown toenail     Ingrown Toenails    Insulin resistance     Kidney stone 1998    Kidney stones 1995    S/P lithotripsy    Knee pain     Osteopenia     Plantar fasciitis     with right knee pain    Sleep apnea     cpap    Stress fracture of foot     treated by Brian    Tear, knee, medial meniscus     right knee    Weight gain     ongoing issue       No Known Allergies    Past Surgical History:   Procedure Laterality Date    CYSTOSCOPY W/ URETEROSCOPY W/ LITHOTRIPSY      HERNIA REPAIR  2003    umbillical    KNEE ARTHROSCOPY Right 02/04/2020    Procedure: KNEE ARTHROSCOPY PARTIAL MEDIAL MENISECTOMY AND CHONDROPLASTY;  Surgeon: Denis Cleaning MD;  Location: [facility] [place];  Service: Orthopedics;  Laterality: Right;    SINUS SURGERY         Family History   Problem Relation Age of Onset    Sleep apnea Father     Diabetes Father     Hyperlipidemia Father     Sleep apnea Sister     Diabetes Other     Hyperlipidemia Other     Hypertension Other     Other  Other         Fatty Oxidation Disorder (SCHADD)    Malig Hyperthermia Neg Hx        Social History     Socioeconomic History    Marital status:    Tobacco Use    Smoking status: Never     Passive exposure: Never    Smokeless tobacco: Never   Vaping Use    Vaping status: Never Used   Substance and Sexual Activity    Alcohol use: No    Drug use: No    Sexual activity: Yes     Partners: Female     Birth control/protection: None     Prior to Admission medications    Medication Sig Start Date End Date Taking? Authorizing Provider   amLODIPine (NORVASC) 10 MG tablet TAKE 1 TABLET BY MOUTH DAILY 3/11/24   Nallely Fields APRN   Farxiga 10 MG tablet TAKE 1 TABLET BY MOUTH DAILY 12/14/23   Nallely Fields APRN   glucose blood (FREESTYLE LITE) test strip Test BID PRN 2/2/22   Nallely Fields APRN   hydroCHLOROthiazide (HYDRODIURIL) 12.5 MG tablet TAKE 1 TABLET BY MOUTH DAILY 12/14/23   Nallely Fields APRN   Lancets (FREESTYLE) lancets Twice daily and prn 1/30/19   Nallely Fields APRN   losartan (COZAAR) 50 MG tablet TAKE 1 TABLET BY MOUTH DAILY 12/14/23   Nallely Fields APRN   meloxicam (MOBIC) 15 MG tablet TAKE 1 TABLET BY MOUTH DAILY 4/4/24   Nallely Fields APRN   metFORMIN (GLUCOPHAGE) 500 MG tablet TAKE 2 TABLETS BY MOUTH TWICE A DAY 4/4/24   Nallely Fields APRN   Semaglutide, 2 MG/DOSE, (Ozempic, 2 MG/DOSE,) 8 MG/3ML solution pen-injector Inject 2 mg under the skin into the appropriate area as directed 1 (One) Time Per Week. 3/5/24   Nallely Fields APRN   sildenafil (VIAGRA) 25 MG tablet TAKE 1 TO 4 TABLETS BY MOUTH ONCE DAILY AS NEEDED FOR ERECTILE DYSFUNCTION 3/13/24   Nallely Fields APRN           Objective   Physical Exam  58-year-old male awake alert.  He is in obvious distress with pain writhing.  Chest clear equal breath sounds cardiovascular regular rhythm abdomen soft he complains of left flank pain  rating left abdomen.  Skin without rash noted.  Procedures           ED Course                                             Medical Decision Making  Amount and/or Complexity of Data Reviewed  Labs: ordered.  Radiology: ordered.    Risk  Prescription drug management.      Chart review: Patient had office visit March for severe obstructive sleep apnea  Comorbidity: As per past history   Differential: Ureterolithiasis, aneurysm, UTI  My EKG interpretation: Not indicated  Lab: Urinalysis greater than 1000 mg/dL glucose 50 mg/dL ketone 0-2 red cells 0-2 white cells basic metabolic panel reveals glucose 183 with BUN 27 creatinine 1.54  My Radiology review and interpretation: CT scan abdomen pelvis reveals left nephrolithiasis with multiple 2 to 4 mm stones.  There is no obstructive uropathy.  Right kidney is unremarkable.  There is bladder calculi measuring approximately 7 mm.  Discussion/treatment: Patient IV placed.  Was started IV fluids.  Review of his chart shows most recent renal function 4 weeks ago had a BUN of 13 with a creatinine 1.02.  Patient IV placed.  Was given Dilaudid ketorolac and Zofran he had improvement in his pain with this.  Repeat evaluation patient is now pain-free.  Findings were discussed with him.  He reports he has been drinking a lot of fluids.  He was given a liter of fluids.  Advised to strain his urine his calcium on basic metabolic panel was noted to be normal.  Advised to follow-up with the urologist return new or worsening symptoms.  Advised to follow-up with his prime provider regarding change in kidney function.  Patient was evaluated using appropriate PPE          Final diagnoses:   Ureterolithiasis   Acute kidney injury   Flank pain       ED Disposition  ED Disposition       ED Disposition   Discharge    Condition   Stable    Comment   --               Nallely Fields, APRN  [address]  [place] [id number] [phone]          [facility] - [place]  [facility]  [address] [address]  [phone]             Medication List      No changes were made to your prescriptions during this visit.            Kal Mina MD  05/02/24 1943

## 2024-05-02 NOTE — DISCHARGE INSTRUCTIONS
Strain urine.  Save stone when it passes.  Return new or worsening symptoms,  Follow-up with primary provider regarding changing creatinine

## 2024-05-15 RX ORDER — SEMAGLUTIDE 2.68 MG/ML
INJECTION, SOLUTION SUBCUTANEOUS
Qty: 9 ML | Refills: 1 | Status: SHIPPED | OUTPATIENT
Start: 2024-05-15

## 2024-05-15 NOTE — TELEPHONE ENCOUNTER
Rx Refill Note  Requested Prescriptions     Pending Prescriptions Disp Refills    Ozempic, 2 MG/DOSE, 8 MG/3ML solution pen-injector [Pharmacy Med Name: OZEMPIC 2 MG/DOSE (8 MG/3 ML)] 9 mL 1     Sig: DIAL AND INJECT UNDER THE SKIN 2 MG WEEKLY      Last office visit with prescribing clinician: 4/4/2024   Last telemedicine visit with prescribing clinician: Visit date not found   Next office visit with prescribing clinician: 8/8/2024                         Would you like a call back once the refill request has been completed: [] Yes [] No    If the office needs to give you a call back, can they leave a voicemail: [] Yes [] No    Brayan Espinosa Rep  05/15/24, 09:54 EDT

## 2024-05-17 NOTE — TELEPHONE ENCOUNTER
Rx Refill Note  Requested Prescriptions     Pending Prescriptions Disp Refills    Ozempic, 1 MG/DOSE, 4 MG/3ML solution pen-injector [Pharmacy Med Name: OZEMPIC 1 MG/DOSE (4 MG/3 ML)] 9 mL 1     Sig: DIAL AND INJECT UNDER THE SKIN 1 MG WEEKLY      Last office visit with prescribing clinician: 9/19/2023   Last telemedicine visit with prescribing clinician: Visit date not found   Next office visit with prescribing clinician: 3/19/2024                         Would you like a call back once the refill request has been completed: [] Yes [] No    If the office needs to give you a call back, can they leave a voicemail: [] Yes [] No    Macy Marley MA  03/04/24, 10:02 EST   Soham Morales, RD

## 2024-06-11 RX ORDER — LOSARTAN POTASSIUM 50 MG/1
50 TABLET ORAL DAILY
Qty: 90 TABLET | Refills: 1 | Status: SHIPPED | OUTPATIENT
Start: 2024-06-11

## 2024-06-13 RX ORDER — HYDROCHLOROTHIAZIDE 12.5 MG/1
12.5 TABLET ORAL DAILY
Qty: 90 TABLET | Refills: 1 | Status: SHIPPED | OUTPATIENT
Start: 2024-06-13

## 2024-06-13 RX ORDER — DAPAGLIFLOZIN 10 MG/1
1 TABLET, FILM COATED ORAL DAILY
Qty: 90 TABLET | Refills: 1 | Status: SHIPPED | OUTPATIENT
Start: 2024-06-13

## 2024-07-17 RX ORDER — SILDENAFIL 25 MG/1
TABLET, FILM COATED ORAL
Qty: 90 TABLET | Refills: 1 | Status: SHIPPED | OUTPATIENT
Start: 2024-07-17

## 2024-08-08 ENCOUNTER — OFFICE VISIT (OUTPATIENT)
Dept: FAMILY MEDICINE CLINIC | Facility: CLINIC | Age: 59
End: 2024-08-08
Payer: COMMERCIAL

## 2024-08-08 VITALS
DIASTOLIC BLOOD PRESSURE: 88 MMHG | OXYGEN SATURATION: 98 % | BODY MASS INDEX: 39.25 KG/M2 | HEIGHT: 69 IN | SYSTOLIC BLOOD PRESSURE: 134 MMHG | HEART RATE: 77 BPM | WEIGHT: 265 LBS

## 2024-08-08 DIAGNOSIS — I10 ESSENTIAL HYPERTENSION: ICD-10-CM

## 2024-08-08 DIAGNOSIS — Z12.11 SCREENING FOR COLON CANCER: ICD-10-CM

## 2024-08-08 DIAGNOSIS — E11.9 TYPE 2 DIABETES MELLITUS WITHOUT COMPLICATION, WITHOUT LONG-TERM CURRENT USE OF INSULIN: Primary | ICD-10-CM

## 2024-08-08 PROCEDURE — 99214 OFFICE O/P EST MOD 30 MIN: CPT | Performed by: NURSE PRACTITIONER

## 2024-08-09 LAB
ALBUMIN SERPL-MCNC: 4.5 G/DL (ref 3.5–5.2)
ALBUMIN/GLOB SERPL: 2.3 G/DL
ALP SERPL-CCNC: 64 U/L (ref 39–117)
ALT SERPL-CCNC: 64 U/L (ref 1–41)
AST SERPL-CCNC: 29 U/L (ref 1–40)
BILIRUB SERPL-MCNC: 0.5 MG/DL (ref 0–1.2)
BUN SERPL-MCNC: 17 MG/DL (ref 6–20)
BUN/CREAT SERPL: 18.9 (ref 7–25)
CALCIUM SERPL-MCNC: 9.6 MG/DL (ref 8.6–10.5)
CHLORIDE SERPL-SCNC: 101 MMOL/L (ref 98–107)
CO2 SERPL-SCNC: 21.9 MMOL/L (ref 22–29)
CREAT SERPL-MCNC: 0.9 MG/DL (ref 0.76–1.27)
EGFRCR SERPLBLD CKD-EPI 2021: 99 ML/MIN/1.73
GLOBULIN SER CALC-MCNC: 2 GM/DL
GLUCOSE SERPL-MCNC: 139 MG/DL (ref 65–99)
HBA1C MFR BLD: 7.7 % (ref 4.8–5.6)
POTASSIUM SERPL-SCNC: 4.3 MMOL/L (ref 3.5–5.2)
PROT SERPL-MCNC: 6.5 G/DL (ref 6–8.5)
SODIUM SERPL-SCNC: 136 MMOL/L (ref 136–145)

## 2024-08-13 NOTE — PROGRESS NOTES
"Chief Complaint  Diabetes and Hypertension    Subjective        Raymundo Fall presents to [facility]  Diabetes    Hypertension      History of Present Illness  The patient presents for follow-up on blood pressure and diabetes.    He reports overall good health, with the exception of persistent knee and foot pain. he plans to revisit his orthopedist, who previously performed his knee surgery for a torn meniscus in 2020. His arthritis is causing him significant discomfort, and his orthopedist suggested the use of a gel injection. His current medications include Ozempic, metformin, and Farxiga. he does not require any medication refills at this time. he is seeking a new podiatrist. His most recent eye exam was in 04/2023. He has no current health concerns.    Objective   Vital Signs:  /88 (BP Location: Right arm, Patient Position: Sitting, Cuff Size: Large Adult)   Pulse 77   Ht 175.3 cm (69\")   Wt 120 kg (265 lb)   SpO2 98%   BMI 39.13 kg/m²   Estimated body mass index is 39.13 kg/m² as calculated from the following:    Height as of this encounter: 175.3 cm (69\").    Weight as of this encounter: 120 kg (265 lb).               Physical Exam  Constitutional:       General: He is not in acute distress.     Appearance: He is well-developed. He is not diaphoretic.   Cardiovascular:      Rate and Rhythm: Normal rate and regular rhythm.      Heart sounds: Normal heart sounds. No murmur heard.     No friction rub. No gallop.   Pulmonary:      Effort: Pulmonary effort is normal. No respiratory distress.      Breath sounds: Normal breath sounds. No wheezing or rales.   Musculoskeletal:      Cervical back: Neck supple.   Skin:     General: Skin is warm and dry.   Neurological:      Mental Status: He is alert and oriented to person, place, and time.       Physical Exam  Vital Signs  Vitals show a blood pressure of 134/88. Weight is 265 pounds. BMI is in the 30s.    Result Review " :          Results      Assessment & Plan  1. Routine follow-up.  he is due for a Cologuard test. His blood pressure is slightly elevated, but he has lost 10 pounds since 05/2023 and 25 pounds since 04/2023. His BMI is currently in the 30s, indicating obesity rather than morbid obesity. A Cologuard test will be ordered for her. Her A1c and CMP will be checked today. A referral will be made to Dr. Rai for her foot issues.    Follow-up  She will follow up in 6 months.           Assessment and Plan     Diagnoses and all orders for this visit:    1. Type 2 diabetes mellitus without complication, without long-term current use of insulin (Primary)  -     Hemoglobin A1c  -     Comprehensive metabolic panel    2. Essential hypertension  -     Comprehensive metabolic panel    3. Screening for colon cancer  -     Cologuard - Stool, Per Rectum; Future             Follow Up     No follow-ups on file.  Patient was given instructions and counseling regarding his condition or for health maintenance advice. Please see specific information pulled into the AVS if appropriate.       Patient or patient representative verbalized consent for the use of Ambient Listening during the visit with  THANH Domingo for chart documentation. 8/12/2024  20:20 EDT

## 2024-09-05 RX ORDER — AMLODIPINE BESYLATE 10 MG/1
10 TABLET ORAL DAILY
Qty: 90 TABLET | Refills: 1 | Status: SHIPPED | OUTPATIENT
Start: 2024-09-05

## 2024-09-30 NOTE — TELEPHONE ENCOUNTER
Rx Refill Note  Requested Prescriptions     Pending Prescriptions Disp Refills    metFORMIN (GLUCOPHAGE) 500 MG tablet [Pharmacy Med Name: METFORMIN  MG TABLET] 360 tablet 1     Sig: TAKE 2 TABLETS BY MOUTH TWICE A DAY    meloxicam (MOBIC) 15 MG tablet [Pharmacy Med Name: MELOXICAM 15 MG TABLET] 90 tablet 1     Sig: TAKE 1 TABLET BY MOUTH DAILY      Last office visit with prescribing clinician: 8/8/2024   Last telemedicine visit with prescribing clinician: Visit date not found   Next office visit with prescribing clinician: 2/13/2025                         Would you like a call back once the refill request has been completed: [] Yes [] No    If the office needs to give you a call back, can they leave a voicemail: [] Yes [] No    Osmin Lewis MA  09/30/24, 08:16 EDT

## 2024-10-01 RX ORDER — MELOXICAM 15 MG/1
15 TABLET ORAL DAILY
Qty: 90 TABLET | Refills: 1 | Status: SHIPPED | OUTPATIENT
Start: 2024-10-01

## 2024-10-03 RX ORDER — MELOXICAM 15 MG/1
15 TABLET ORAL DAILY
Qty: 90 TABLET | Refills: 1 | OUTPATIENT
Start: 2024-10-03

## 2024-10-17 ENCOUNTER — OFFICE VISIT (OUTPATIENT)
Dept: SLEEP MEDICINE | Facility: CLINIC | Age: 59
End: 2024-10-17
Payer: COMMERCIAL

## 2024-10-17 VITALS
OXYGEN SATURATION: 96 % | WEIGHT: 275 LBS | HEART RATE: 74 BPM | HEIGHT: 69 IN | BODY MASS INDEX: 40.73 KG/M2 | DIASTOLIC BLOOD PRESSURE: 85 MMHG | SYSTOLIC BLOOD PRESSURE: 140 MMHG

## 2024-10-17 DIAGNOSIS — G47.33 SEVERE OBSTRUCTIVE SLEEP APNEA: Primary | ICD-10-CM

## 2024-10-17 DIAGNOSIS — E66.01 SEVERE OBESITY (BMI >= 40): ICD-10-CM

## 2024-10-17 PROCEDURE — G0463 HOSPITAL OUTPT CLINIC VISIT: HCPCS

## 2024-10-17 NOTE — PROGRESS NOTES
"  [address] [address]  Phone   Fax         SLEEP CLINIC FOLLOW-UP PROGRESS NOTE    Raymundo Fall  [id number]   1965  59 y.o.  male      PCP: Nallely Fields APRN    DATE OF VISIT: 10/17/2024          CHIEF COMPLAINT: Severe obstructive sleep apnea    HPI:  This is a 59 y.o. year old patient who presents to the clinic today for the management of obstructive sleep apnea.  Patient had sleep study done 7/17/2008 through Dr. Mckeon showing severe obstructive sleep apnea with AHI of 52.1/h.  This was a split-night study and patient was titrated to CPAP at 13 cm H2O.   He established with our office 2/2024 at which point his machine was over 5 years old and he was needing replacement.  He still felt he did well with the pressure at 13.  AHI was at goal.  New ResMed machine was ordered.  AHI was well-controlled at initial compliance visit.  Now presents today for 6-month follow-up.  Patient's sleep apnea remains improved with this therapy with residual AHI 0.5/hr.   Average usage is 8 hours 20 minutes per night on nights used.   Patient tolerating device well and improved with treatment.  He reports he has tried several different mask styles and this is the best fit he has gotten.  Not opening mouth.  Recommend trial of CPAP pillow to see if we can decrease mask leak.           MEDICATIONS: reviewed     ALLERGIES:  Patient has no known allergies.    SOCIAL HISTORY (habits pertaining to sleep medicine):  Tobacco use: No   Alcohol use: 0 per week  Caffeine use: 2     REVIEW OF SYSTEMS:   Pertinent positive symptoms are:  [place] Sleepiness Scale :Total score: 1         PHYSICAL EXAMINATION:  CONSTITUTIONAL:  Vitals:    10/17/24 0900   BP: 140/85   BP Location: Left arm   Patient Position: Sitting   Pulse: 74   SpO2: 96%   Weight: 125 kg (275 lb)   Height: 175.3 cm (69\")    Body mass index is 40.61 kg/m².   HEAD: atraumatic, " normocephalic  RESP SYSTEM: not in respiratory distress, breathing unlabored  CARDIOVASULAR: normal rate, no edema noted   NEURO: Alert and oriented x 3, mood and affect appeared appropriate      DATA REVIEWED:  The PAP compliance summary downloaded on 10/16/2024 has been reviewed independently by me and discussed with the patient.   Compliance: 100%  More than 4 hr use: 100%  Average use of the device: 8 hours 20 minutes per night  Residual AHI: 0.5 /hr (goal < 5.0 /hr)  Device: ResMed air sense 11  Mask type: Nasal  DME: [facility]          ASSESSMENT AND PLAN:  Obstructive Sleep Apnea: sleep apnea has improved with the device and treatment.  Patient has excellent compliance with the device for treatment of sleep apnea.  I have personally reviewed the smart card download and discussed the download data with the patient and encouarged continued use of the device.  The residual AHI is acceptable. The device is benefiting the patient and the device is medically necessary.  Recommend patient get supplies from the DME company, change them on a regular basis, and clean as directed.  A prescription for supplies has been sent to the [facility].  Recommend continued usage of PAP device, most insurances require minimum usage of 4 hours per night at least 70% of the time, would recommend using all night every night if possible for optimum health.  Recommend prioritizing sleep to aid in overall health.  Do not drive or operate heavy machinery or do activities that require high concentration if feeling tired or drowsy. Discussed CPAP pillow to address mask leak.   Severe Obesity: Body mass index is 40.61 kg/m².. Patients who are overweight or obese are at increased risk of sleep apnea/ sleep disordered breathing. Weight reduction and healthy lifestyle are encouraged in overweight/ obese patients as part of a comprehensive approach to sleep apnea treatment.        Patient will follow-up in 1 year or follow-up sooner  for any issues or concerns.  Patient's questions were answered.          Thank you for allowing me to participate in the care of this patient.    Merline Shah DNP, APRN  [facility]

## 2024-12-02 RX ORDER — SILDENAFIL 25 MG/1
TABLET, FILM COATED ORAL
Qty: 90 TABLET | Refills: 1 | Status: SHIPPED | OUTPATIENT
Start: 2024-12-02

## 2024-12-19 RX ORDER — HYDROCHLOROTHIAZIDE 12.5 MG/1
12.5 TABLET ORAL DAILY
Qty: 90 TABLET | Refills: 1 | Status: SHIPPED | OUTPATIENT
Start: 2024-12-19

## 2024-12-19 RX ORDER — LOSARTAN POTASSIUM 50 MG/1
50 TABLET ORAL DAILY
Qty: 90 TABLET | Refills: 1 | Status: SHIPPED | OUTPATIENT
Start: 2024-12-19

## 2024-12-30 RX ORDER — DAPAGLIFLOZIN 10 MG/1
1 TABLET, FILM COATED ORAL DAILY
Qty: 90 TABLET | Refills: 1 | Status: SHIPPED | OUTPATIENT
Start: 2024-12-30

## 2025-02-13 ENCOUNTER — OFFICE VISIT (OUTPATIENT)
Dept: FAMILY MEDICINE CLINIC | Facility: CLINIC | Age: 60
End: 2025-02-13
Payer: COMMERCIAL

## 2025-02-13 VITALS
SYSTOLIC BLOOD PRESSURE: 120 MMHG | DIASTOLIC BLOOD PRESSURE: 74 MMHG | HEART RATE: 81 BPM | OXYGEN SATURATION: 98 % | HEIGHT: 69 IN | WEIGHT: 285 LBS | BODY MASS INDEX: 42.21 KG/M2

## 2025-02-13 DIAGNOSIS — Z00.00 ANNUAL PHYSICAL EXAM: Primary | ICD-10-CM

## 2025-02-13 DIAGNOSIS — G47.33 OSA ON CPAP: ICD-10-CM

## 2025-02-13 DIAGNOSIS — I10 ESSENTIAL HYPERTENSION: ICD-10-CM

## 2025-02-13 DIAGNOSIS — G89.29 CHRONIC PAIN OF RIGHT KNEE: ICD-10-CM

## 2025-02-13 DIAGNOSIS — E11.9 TYPE 2 DIABETES MELLITUS WITHOUT COMPLICATION, WITHOUT LONG-TERM CURRENT USE OF INSULIN: ICD-10-CM

## 2025-02-13 DIAGNOSIS — M25.561 CHRONIC PAIN OF RIGHT KNEE: ICD-10-CM

## 2025-02-13 DIAGNOSIS — Z12.5 SCREENING FOR PROSTATE CANCER: ICD-10-CM

## 2025-02-13 PROCEDURE — 99214 OFFICE O/P EST MOD 30 MIN: CPT | Performed by: NURSE PRACTITIONER

## 2025-02-14 LAB
ALBUMIN SERPL-MCNC: 4.4 G/DL (ref 3.5–5.2)
ALBUMIN/GLOB SERPL: 1.6 G/DL
ALP SERPL-CCNC: 69 U/L (ref 39–117)
ALT SERPL-CCNC: 69 U/L (ref 1–41)
AST SERPL-CCNC: 36 U/L (ref 1–40)
BASOPHILS # BLD AUTO: 0.04 10*3/MM3 (ref 0–0.2)
BASOPHILS NFR BLD AUTO: 0.9 % (ref 0–1.5)
BILIRUB SERPL-MCNC: 0.6 MG/DL (ref 0–1.2)
BUN SERPL-MCNC: 14 MG/DL (ref 6–20)
BUN/CREAT SERPL: 16.3 (ref 7–25)
CALCIUM SERPL-MCNC: 9.6 MG/DL (ref 8.6–10.5)
CHLORIDE SERPL-SCNC: 101 MMOL/L (ref 98–107)
CHOLEST SERPL-MCNC: 152 MG/DL (ref 0–200)
CO2 SERPL-SCNC: 23.7 MMOL/L (ref 22–29)
CREAT SERPL-MCNC: 0.86 MG/DL (ref 0.76–1.27)
EGFRCR SERPLBLD CKD-EPI 2021: 100.4 ML/MIN/1.73
EOSINOPHIL # BLD AUTO: 0.2 10*3/MM3 (ref 0–0.4)
EOSINOPHIL NFR BLD AUTO: 4.4 % (ref 0.3–6.2)
ERYTHROCYTE [DISTWIDTH] IN BLOOD BY AUTOMATED COUNT: 11.8 % (ref 12.3–15.4)
GLOBULIN SER CALC-MCNC: 2.8 GM/DL
GLUCOSE SERPL-MCNC: 145 MG/DL (ref 65–99)
HBA1C MFR BLD: 7.6 % (ref 4.8–5.6)
HCT VFR BLD AUTO: 50.6 % (ref 37.5–51)
HDLC SERPL-MCNC: 44 MG/DL (ref 40–60)
HGB BLD-MCNC: 17.5 G/DL (ref 13–17.7)
IMM GRANULOCYTES # BLD AUTO: 0 10*3/MM3 (ref 0–0.05)
IMM GRANULOCYTES NFR BLD AUTO: 0 % (ref 0–0.5)
LDLC SERPL CALC-MCNC: 87 MG/DL (ref 0–100)
LDLC/HDLC SERPL: 1.91 {RATIO}
LYMPHOCYTES # BLD AUTO: 1.4 10*3/MM3 (ref 0.7–3.1)
LYMPHOCYTES NFR BLD AUTO: 30.9 % (ref 19.6–45.3)
MCH RBC QN AUTO: 30 PG (ref 26.6–33)
MCHC RBC AUTO-ENTMCNC: 34.6 G/DL (ref 31.5–35.7)
MCV RBC AUTO: 86.6 FL (ref 79–97)
MONOCYTES # BLD AUTO: 0.57 10*3/MM3 (ref 0.1–0.9)
MONOCYTES NFR BLD AUTO: 12.6 % (ref 5–12)
NEUTROPHILS # BLD AUTO: 2.32 10*3/MM3 (ref 1.7–7)
NEUTROPHILS NFR BLD AUTO: 51.2 % (ref 42.7–76)
NRBC BLD AUTO-RTO: 0 /100 WBC (ref 0–0.2)
PLATELET # BLD AUTO: 158 10*3/MM3 (ref 140–450)
POTASSIUM SERPL-SCNC: 4 MMOL/L (ref 3.5–5.2)
PROT SERPL-MCNC: 7.2 G/DL (ref 6–8.5)
PSA SERPL-MCNC: 0.21 NG/ML (ref 0–4)
RBC # BLD AUTO: 5.84 10*6/MM3 (ref 4.14–5.8)
SODIUM SERPL-SCNC: 137 MMOL/L (ref 136–145)
TRIGL SERPL-MCNC: 119 MG/DL (ref 0–150)
TSH SERPL DL<=0.005 MIU/L-ACNC: 1.72 UIU/ML (ref 0.27–4.2)
VLDLC SERPL CALC-MCNC: 21 MG/DL (ref 5–40)
WBC # BLD AUTO: 4.53 10*3/MM3 (ref 3.4–10.8)

## 2025-02-18 DIAGNOSIS — E11.9 TYPE 2 DIABETES MELLITUS WITHOUT COMPLICATION, WITHOUT LONG-TERM CURRENT USE OF INSULIN: Primary | ICD-10-CM

## 2025-02-18 RX ORDER — TIRZEPATIDE 2.5 MG/.5ML
2.5 INJECTION, SOLUTION SUBCUTANEOUS WEEKLY
Qty: 2 ML | Refills: 0 | Status: SHIPPED | OUTPATIENT
Start: 2025-02-18

## 2025-02-19 NOTE — PROGRESS NOTES
Chief Complaint  Diabetes    Subjective        Raymundo Fall presents to [facility]  Diabetes      History of Present Illness  The patient presents for evaluation of knee pain, diabetes, kidney stones, and health maintenance.    He reports persistent knee discomfort, which is being managed through regular orthopedic consultations. He has received a cortisone injection, the effects of which lasted approximately 6 months. A recent injection was administered 3 days ago, and he anticipates a similar duration of relief. The patient notes that it takes several days for the pain to subside after receiving the injection. He is currently not considering knee replacement surgery. The patient mentions that his sister also experiences severe pain after receiving injections, which significantly limits her mobility, but she finds them highly effective.    He has a history of kidney stones, one of which necessitated an emergency room visit. He was informed of the presence of 3 additional small stones, the cause of which remains undetermined. He maintains adequate hydration and has not been prescribed Flomax. He has successfully passed a large stone in the past.    He has been experiencing foot issues for the past 6 months, which have been managed with orthotics and footwear modifications. His last podiatry consultation was several months ago. He has not undergone any neuropathy testing.    He is currently unemployed and spends most of his time at home. He undergoes annual eye examinations, with the most recent one conducted in October 2024. He does not require any medication refills at this time. He is attempting to incorporate walking into his routine, weather permitting.    He continues to take Ozempic without any issues. He has observed an increase in his A1c levels, which he attributes to his knee pain and the use of a foot cream.    His sleep quality is satisfactory, and he uses a CPAP  "machine. He receives care from the [facility] [place], where he recently obtained a new CPAP machine.    SOCIAL HISTORY  The patient is currentlyretired and spends most of his time at home.    MEDICATIONS  Current: Ozempic    IMMUNIZATIONS  The patient has not received the pneumonia vaccine and does not plan on getting any more COVID-19 vaccines.    Objective   Vital Signs:  /74 (BP Location: Left arm, Patient Position: Sitting, Cuff Size: Large Adult)   Pulse 81   Ht 175.3 cm (69\")   Wt 129 kg (285 lb)   SpO2 98%   BMI 42.09 kg/m²   Estimated body mass index is 42.09 kg/m² as calculated from the following:    Height as of this encounter: 175.3 cm (69\").    Weight as of this encounter: 129 kg (285 lb).             Physical Exam  Vitals reviewed.   Constitutional:       General: He is not in acute distress.     Appearance: He is well-developed. He is not diaphoretic.   HENT:      Head: Normocephalic and atraumatic.      Right Ear: Tympanic membrane, ear canal and external ear normal.      Left Ear: Tympanic membrane, ear canal and external ear normal.      Nose: Nose normal.      Mouth/Throat:      Mouth: Mucous membranes are moist.      Pharynx: Oropharynx is clear. Uvula midline. No oropharyngeal exudate.   Eyes:      Conjunctiva/sclera: Conjunctivae normal.      Pupils: Pupils are equal, round, and reactive to light.   Cardiovascular:      Rate and Rhythm: Normal rate and regular rhythm.      Heart sounds: Normal heart sounds. No murmur heard.     No friction rub. No gallop.   Pulmonary:      Effort: Pulmonary effort is normal. No respiratory distress.      Breath sounds: Normal breath sounds. No wheezing or rales.   Abdominal:      General: Bowel sounds are normal. There is no distension.      Palpations: Abdomen is soft.      Tenderness: There is no abdominal tenderness.   Musculoskeletal:      Cervical back: Neck supple.   Lymphadenopathy:      Cervical: No cervical adenopathy.   Skin:     General: " Skin is warm and dry.   Neurological:      Mental Status: He is alert and oriented to person, place, and time.   Psychiatric:         Mood and Affect: Mood normal.       Physical Exam  Vital Signs  Blood pressure is 120/74.    Result Review :          Results      Assessment & Plan  1. Knee pain.  The patient reports that his knee pain has remained about the same, but he has been receiving cortisone shots from orthopedics. The last shot lasted 6 months, and he recently received another one. He is advised to continue with the cortisone shots every 3 months or as long as they remain effective. If the pain persists or worsens, knee replacement surgery may be considered.    2. Kidney stones.  The patient has a history of kidney stones and was advised to increase water intake to help flush out any potential stones and prevent new ones from forming. He was also advised to avoid soft drinks. No current medication was prescribed for this condition.    3. Diabetes mellitus.  The patient is currently taking Ozempic and reports no issues with obtaining the medication. His A1c levels will be monitored closely. If the A1c levels continue to rise, switching to Mounjaro may be considered, pending insurance coverage.    4. Health maintenance.  A comprehensive panel of laboratory tests will be conducted today, including a PSA level. The patient is advised to receive the shingles vaccine at his pharmacy. A foot examination will be performed during this visit to check for neuropathies.    5. Sleep apnea.  The patient reports that he is using a CPAP machine and is currently under the care of a sleep center at [facility]. He has received a new CPAP machine and reports good sleep quality.    Follow-up  The patient will follow up in 6 months.    PROCEDURE  The patient received a cortisone injection in the knee 3 days ago.           Assessment and Plan     Diagnoses and all orders for this visit:    1. Annual physical exam (Primary)  -      PSA Screen  -     CBC & Differential  -     Comprehensive Metabolic Panel  -     Lipid Panel With LDL / HDL Ratio  -     TSH Rfx On Abnormal To Free T4  -     Hemoglobin A1c    2. Essential hypertension  -     CBC & Differential  -     Comprehensive Metabolic Panel  -     TSH Rfx On Abnormal To Free T4    3. Type 2 diabetes mellitus without complication, without long-term current use of insulin  -     CBC & Differential  -     Comprehensive Metabolic Panel  -     Hemoglobin A1c    4. Screening for prostate cancer  -     PSA Screen    5. Chronic pain of right knee    6. BEVERLY on CPAP             Follow Up     No follow-ups on file.  Patient was given instructions and counseling regarding his condition or for health maintenance advice. Please see specific information pulled into the AVS if appropriate.       Patient or patient representative verbalized consent for the use of Ambient Listening during the visit with  THANH Domingo for chart documentation. 2/19/2025  13:08 EST

## 2025-03-10 RX ORDER — AMLODIPINE BESYLATE 10 MG/1
10 TABLET ORAL DAILY
Qty: 90 TABLET | Refills: 1 | Status: SHIPPED | OUTPATIENT
Start: 2025-03-10

## 2025-03-24 NOTE — TELEPHONE ENCOUNTER
Rx Refill Note  Requested Prescriptions     Pending Prescriptions Disp Refills    Ozempic, 2 MG/DOSE, 8 MG/3ML solution pen-injector [Pharmacy Med Name: OZEMPIC 2 MG/DOSE (8 MG/3 ML)] 9 mL      Sig: DIAL AND INJECT UNDER THE SKIN 2 MG WEEKLY      Last office visit with prescribing clinician: 2/13/2025   Last telemedicine visit with prescribing clinician: Visit date not found   Next office visit with prescribing clinician: 8/13/2025                         Would you like a call back once the refill request has been completed: [] Yes [] No    If the office needs to give you a call back, can they leave a voicemail: [] Yes [] No    Osmin Lewis MA  03/24/25, 09:46 EDT

## 2025-03-25 RX ORDER — SEMAGLUTIDE 2.68 MG/ML
INJECTION, SOLUTION SUBCUTANEOUS
Qty: 9 ML | OUTPATIENT
Start: 2025-03-25

## 2025-03-31 NOTE — TELEPHONE ENCOUNTER
Rx Refill Note  Requested Prescriptions     Pending Prescriptions Disp Refills    meloxicam (MOBIC) 15 MG tablet [Pharmacy Med Name: MELOXICAM 15 MG TABLET] 90 tablet 1     Sig: TAKE 1 TABLET BY MOUTH DAILY      Last office visit with prescribing clinician: 2/13/2025   Last telemedicine visit with prescribing clinician: Visit date not found   Next office visit with prescribing clinician: 8/13/2025                         Would you like a call back once the refill request has been completed: [] Yes [] No    If the office needs to give you a call back, can they leave a voicemail: [] Yes [] No    Osmin Lewis MA  03/31/25, 08:58 EDT

## 2025-04-01 RX ORDER — MELOXICAM 15 MG/1
15 TABLET ORAL DAILY
Qty: 90 TABLET | Refills: 1 | Status: SHIPPED | OUTPATIENT
Start: 2025-04-01

## 2025-05-03 RX ORDER — SILDENAFIL 25 MG/1
TABLET, FILM COATED ORAL
Qty: 90 TABLET | Refills: 1 | Status: SHIPPED | OUTPATIENT
Start: 2025-05-03

## 2025-05-19 RX ORDER — TIRZEPATIDE 5 MG/.5ML
INJECTION, SOLUTION SUBCUTANEOUS
Qty: 2 ML | Refills: 1 | Status: SHIPPED | OUTPATIENT
Start: 2025-05-19

## 2025-05-19 NOTE — TELEPHONE ENCOUNTER
Rx Refill Note  Requested Prescriptions     Pending Prescriptions Disp Refills    Mounjaro 5 MG/0.5ML solution auto-injector [Pharmacy Med Name: MOUNJARO 5 MG/0.5 ML PEN] 2 mL 1     Sig: INJECT 5 MG UNDER THE SKIN ONCE WEEKLY      Last office visit with prescribing clinician: 2/13/2025   Last telemedicine visit with prescribing clinician: Visit date not found   Next office visit with prescribing clinician: 8/13/2025                         Would you like a call back once the refill request has been completed: [] Yes [] No    If the office needs to give you a call back, can they leave a voicemail: [] Yes [] No    Osmin Lewis MA  05/19/25, 09:14 EDT

## 2025-06-16 RX ORDER — LOSARTAN POTASSIUM 50 MG/1
50 TABLET ORAL DAILY
Qty: 90 TABLET | Refills: 1 | Status: SHIPPED | OUTPATIENT
Start: 2025-06-16

## 2025-06-16 RX ORDER — HYDROCHLOROTHIAZIDE 12.5 MG/1
12.5 TABLET ORAL DAILY
Qty: 90 TABLET | Refills: 1 | Status: SHIPPED | OUTPATIENT
Start: 2025-06-16

## 2025-06-26 RX ORDER — DAPAGLIFLOZIN 10 MG/1
1 TABLET, FILM COATED ORAL DAILY
Qty: 90 TABLET | Refills: 1 | Status: SHIPPED | OUTPATIENT
Start: 2025-06-26

## 2025-08-03 RX ORDER — TIRZEPATIDE 5 MG/.5ML
INJECTION, SOLUTION SUBCUTANEOUS
Qty: 2 ML | Refills: 1 | Status: CANCELLED | OUTPATIENT
Start: 2025-08-03

## 2025-08-08 RX ORDER — TIRZEPATIDE 5 MG/.5ML
5 INJECTION, SOLUTION SUBCUTANEOUS WEEKLY
Qty: 2 ML | Refills: 1 | Status: SHIPPED | OUTPATIENT
Start: 2025-08-08

## 2025-08-13 ENCOUNTER — OFFICE VISIT (OUTPATIENT)
Dept: FAMILY MEDICINE CLINIC | Facility: CLINIC | Age: 60
End: 2025-08-13
Payer: COMMERCIAL

## 2025-08-13 VITALS
HEART RATE: 91 BPM | BODY MASS INDEX: 41.47 KG/M2 | SYSTOLIC BLOOD PRESSURE: 114 MMHG | OXYGEN SATURATION: 99 % | DIASTOLIC BLOOD PRESSURE: 76 MMHG | WEIGHT: 280 LBS | HEIGHT: 69 IN

## 2025-08-13 DIAGNOSIS — I10 ESSENTIAL HYPERTENSION: ICD-10-CM

## 2025-08-13 DIAGNOSIS — E11.9 TYPE 2 DIABETES MELLITUS WITHOUT COMPLICATION, WITHOUT LONG-TERM CURRENT USE OF INSULIN: Primary | ICD-10-CM

## 2025-08-13 DIAGNOSIS — E78.1 HYPERTRIGLYCERIDEMIA: ICD-10-CM

## 2025-08-14 LAB
ALBUMIN SERPL-MCNC: 4.5 G/DL (ref 3.5–5.2)
ALBUMIN/CREAT UR: <10 MG/G CREAT (ref 0–29)
ALBUMIN/GLOB SERPL: 2 G/DL
ALP SERPL-CCNC: 65 U/L (ref 39–117)
ALT SERPL-CCNC: 51 U/L (ref 1–41)
AST SERPL-CCNC: 27 U/L (ref 1–40)
BASOPHILS # BLD AUTO: 0.05 10*3/MM3 (ref 0–0.2)
BASOPHILS NFR BLD AUTO: 0.5 % (ref 0–1.5)
BILIRUB SERPL-MCNC: 0.7 MG/DL (ref 0–1.2)
BUN SERPL-MCNC: 19 MG/DL (ref 6–20)
BUN/CREAT SERPL: 21.8 (ref 7–25)
CALCIUM SERPL-MCNC: 9.5 MG/DL (ref 8.6–10.5)
CHLORIDE SERPL-SCNC: 103 MMOL/L (ref 98–107)
CHOLEST SERPL-MCNC: 163 MG/DL (ref 0–200)
CO2 SERPL-SCNC: 19.5 MMOL/L (ref 22–29)
CREAT SERPL-MCNC: 0.87 MG/DL (ref 0.76–1.27)
CREAT UR-MCNC: 30.7 MG/DL
EGFRCR SERPLBLD CKD-EPI 2021: 99.4 ML/MIN/1.73
EOSINOPHIL # BLD AUTO: 0.14 10*3/MM3 (ref 0–0.4)
EOSINOPHIL NFR BLD AUTO: 1.4 % (ref 0.3–6.2)
ERYTHROCYTE [DISTWIDTH] IN BLOOD BY AUTOMATED COUNT: 12.4 % (ref 12.3–15.4)
GLOBULIN SER CALC-MCNC: 2.3 GM/DL
GLUCOSE SERPL-MCNC: 166 MG/DL (ref 65–99)
HBA1C MFR BLD: 8.2 % (ref 4.8–5.6)
HCT VFR BLD AUTO: 52.8 % (ref 37.5–51)
HDLC SERPL-MCNC: 47 MG/DL (ref 40–60)
HGB BLD-MCNC: 17.5 G/DL (ref 13–17.7)
IMM GRANULOCYTES # BLD AUTO: 0.05 10*3/MM3 (ref 0–0.05)
IMM GRANULOCYTES NFR BLD AUTO: 0.5 % (ref 0–0.5)
LDLC SERPL CALC-MCNC: 97 MG/DL (ref 0–100)
LDLC/HDLC SERPL: 2.03 {RATIO}
LYMPHOCYTES # BLD AUTO: 1.26 10*3/MM3 (ref 0.7–3.1)
LYMPHOCYTES NFR BLD AUTO: 12.4 % (ref 19.6–45.3)
MCH RBC QN AUTO: 29.7 PG (ref 26.6–33)
MCHC RBC AUTO-ENTMCNC: 33.1 G/DL (ref 31.5–35.7)
MCV RBC AUTO: 89.6 FL (ref 79–97)
MICROALBUMIN UR-MCNC: <3 UG/ML
MONOCYTES # BLD AUTO: 0.79 10*3/MM3 (ref 0.1–0.9)
MONOCYTES NFR BLD AUTO: 7.8 % (ref 5–12)
NEUTROPHILS # BLD AUTO: 7.9 10*3/MM3 (ref 1.7–7)
NEUTROPHILS NFR BLD AUTO: 77.4 % (ref 42.7–76)
NRBC BLD AUTO-RTO: 0 /100 WBC (ref 0–0.2)
PLATELET # BLD AUTO: 156 10*3/MM3 (ref 140–450)
POTASSIUM SERPL-SCNC: 4.1 MMOL/L (ref 3.5–5.2)
PROT SERPL-MCNC: 6.8 G/DL (ref 6–8.5)
RBC # BLD AUTO: 5.89 10*6/MM3 (ref 4.14–5.8)
SODIUM SERPL-SCNC: 137 MMOL/L (ref 136–145)
TRIGL SERPL-MCNC: 104 MG/DL (ref 0–150)
TSH SERPL DL<=0.005 MIU/L-ACNC: 1.8 UIU/ML (ref 0.27–4.2)
VLDLC SERPL CALC-MCNC: 19 MG/DL (ref 5–40)
WBC # BLD AUTO: 10.19 10*3/MM3 (ref 3.4–10.8)

## (undated) DEVICE — GLV SURG SENSICARE PI PF LF 7 GRN STRL

## (undated) DEVICE — ABL ASP APOLLO RF XL 90D

## (undated) DEVICE — BLD SHV DBL/CUT COOLCUT 4MM 13CM

## (undated) DEVICE — GLV SURG SENSICARE MICRO PF LF 6.5 STRL

## (undated) DEVICE — DRSNG WND GZ CURAD OIL EMULSION 3X3IN STRL

## (undated) DEVICE — TUBING SET, GRAVITY, 4-SPIKE

## (undated) DEVICE — PK ARTHSCP 40

## (undated) DEVICE — SKIN PREP TRAY W/CHG: Brand: MEDLINE INDUSTRIES, INC.

## (undated) DEVICE — TUBING, SUCTION, 1/4" X 20', STRAIGHT: Brand: MEDLINE INDUSTRIES, INC.

## (undated) DEVICE — PAD UNDERCAST WYTEX 4IN 4YD LF STRL

## (undated) DEVICE — SUT ETHLN 4/0 PS2 PLSTC 1667G

## (undated) DEVICE — GLV SURG BIOGEL LTX PF 8

## (undated) DEVICE — PAD,ABDOMINAL,8"X10",ST,LF: Brand: MEDLINE

## (undated) DEVICE — GLV SURG TRIUMPH CLASSIC PF LTX 7.5 STRL